# Patient Record
Sex: FEMALE | Race: WHITE | NOT HISPANIC OR LATINO | Employment: FULL TIME | URBAN - METROPOLITAN AREA
[De-identification: names, ages, dates, MRNs, and addresses within clinical notes are randomized per-mention and may not be internally consistent; named-entity substitution may affect disease eponyms.]

---

## 2023-04-10 DIAGNOSIS — G47.00 INSOMNIA, UNSPECIFIED: ICD-10-CM

## 2023-04-10 RX ORDER — TRAZODONE HYDROCHLORIDE 50 MG/1
TABLET ORAL
Qty: 90 TABLET | Refills: 1 | Status: SHIPPED | OUTPATIENT
Start: 2023-04-10 | End: 2023-07-07 | Stop reason: SDUPTHER

## 2023-07-07 ENCOUNTER — OFFICE VISIT (OUTPATIENT)
Dept: PRIMARY CARE | Facility: CLINIC | Age: 58
End: 2023-07-07
Payer: COMMERCIAL

## 2023-07-07 VITALS
HEIGHT: 62 IN | DIASTOLIC BLOOD PRESSURE: 80 MMHG | WEIGHT: 204 LBS | SYSTOLIC BLOOD PRESSURE: 138 MMHG | HEART RATE: 74 BPM | OXYGEN SATURATION: 97 % | BODY MASS INDEX: 37.54 KG/M2

## 2023-07-07 DIAGNOSIS — R73.03 PREDIABETES: ICD-10-CM

## 2023-07-07 DIAGNOSIS — Z13.29 THYROID DISORDER SCREENING: ICD-10-CM

## 2023-07-07 DIAGNOSIS — Z00.00 ANNUAL PHYSICAL EXAM: Primary | ICD-10-CM

## 2023-07-07 DIAGNOSIS — Z13.220 LIPID SCREENING: ICD-10-CM

## 2023-07-07 DIAGNOSIS — E66.9 OBESITY (BMI 35.0-39.9 WITHOUT COMORBIDITY): ICD-10-CM

## 2023-07-07 DIAGNOSIS — G47.00 INSOMNIA, UNSPECIFIED: ICD-10-CM

## 2023-07-07 DIAGNOSIS — F41.9 ANXIETY: ICD-10-CM

## 2023-07-07 PROBLEM — R63.5 WEIGHT GAIN: Status: ACTIVE | Noted: 2023-07-07

## 2023-07-07 PROBLEM — J30.2 SEASONAL ALLERGIC RHINITIS: Status: ACTIVE | Noted: 2023-07-07

## 2023-07-07 PROBLEM — M54.42 ACUTE BILATERAL LOW BACK PAIN WITH LEFT-SIDED SCIATICA: Status: RESOLVED | Noted: 2023-07-07 | Resolved: 2023-07-07

## 2023-07-07 PROBLEM — N95.1 VASOMOTOR SYMPTOMS DUE TO MENOPAUSE: Status: ACTIVE | Noted: 2023-07-07

## 2023-07-07 PROBLEM — M54.42 ACUTE BILATERAL LOW BACK PAIN WITH LEFT-SIDED SCIATICA: Status: ACTIVE | Noted: 2023-07-07

## 2023-07-07 PROBLEM — K58.9 IRRITABLE BOWEL SYNDROME: Status: ACTIVE | Noted: 2023-07-07

## 2023-07-07 PROBLEM — N39.3 FEMALE STRESS INCONTINENCE: Status: ACTIVE | Noted: 2023-07-07

## 2023-07-07 PROBLEM — E55.9 VITAMIN D DEFICIENCY: Status: ACTIVE | Noted: 2023-07-07

## 2023-07-07 PROBLEM — H90.3 ASYMMETRIC SNHL (SENSORINEURAL HEARING LOSS): Status: ACTIVE | Noted: 2023-07-07

## 2023-07-07 PROBLEM — F32.A DEPRESSION: Status: ACTIVE | Noted: 2023-07-07

## 2023-07-07 LAB
ESTIMATED AVERAGE GLUCOSE FOR HBA1C: 111 MG/DL
HEMOGLOBIN A1C/HEMOGLOBIN TOTAL IN BLOOD: 5.5 %
THYROTROPIN (MIU/L) IN SER/PLAS BY DETECTION LIMIT <= 0.05 MIU/L: 2.2 MIU/L (ref 0.44–3.98)

## 2023-07-07 PROCEDURE — 80053 COMPREHEN METABOLIC PANEL: CPT

## 2023-07-07 PROCEDURE — 84443 ASSAY THYROID STIM HORMONE: CPT

## 2023-07-07 PROCEDURE — 1036F TOBACCO NON-USER: CPT | Performed by: STUDENT IN AN ORGANIZED HEALTH CARE EDUCATION/TRAINING PROGRAM

## 2023-07-07 PROCEDURE — 85027 COMPLETE CBC AUTOMATED: CPT

## 2023-07-07 PROCEDURE — 83036 HEMOGLOBIN GLYCOSYLATED A1C: CPT

## 2023-07-07 PROCEDURE — 99396 PREV VISIT EST AGE 40-64: CPT | Performed by: STUDENT IN AN ORGANIZED HEALTH CARE EDUCATION/TRAINING PROGRAM

## 2023-07-07 PROCEDURE — 80061 LIPID PANEL: CPT

## 2023-07-07 RX ORDER — TRAZODONE HYDROCHLORIDE 50 MG/1
50 TABLET ORAL NIGHTLY PRN
Qty: 90 TABLET | Refills: 3 | Status: SHIPPED | OUTPATIENT
Start: 2023-07-07

## 2023-07-07 RX ORDER — SERTRALINE HYDROCHLORIDE 100 MG/1
100 TABLET, FILM COATED ORAL DAILY
Qty: 90 TABLET | Refills: 3 | Status: SHIPPED | OUTPATIENT
Start: 2023-07-07

## 2023-07-07 RX ORDER — ACETAMINOPHEN 500 MG
TABLET ORAL
COMMUNITY

## 2023-07-07 RX ORDER — SERTRALINE HYDROCHLORIDE 100 MG/1
100 TABLET, FILM COATED ORAL DAILY
COMMUNITY
End: 2023-07-07 | Stop reason: SDUPTHER

## 2023-07-07 ASSESSMENT — ENCOUNTER SYMPTOMS
RESPIRATORY NEGATIVE: 1
CARDIOVASCULAR NEGATIVE: 1
GASTROINTESTINAL NEGATIVE: 1
LIGHT-HEADEDNESS: 0
UNEXPECTED WEIGHT CHANGE: 1
HEADACHES: 0
SLEEP DISTURBANCE: 1
BACK PAIN: 1
FATIGUE: 0

## 2023-07-07 ASSESSMENT — PATIENT HEALTH QUESTIONNAIRE - PHQ9
2. FEELING DOWN, DEPRESSED OR HOPELESS: NOT AT ALL
SUM OF ALL RESPONSES TO PHQ9 QUESTIONS 1 AND 2: 0
1. LITTLE INTEREST OR PLEASURE IN DOING THINGS: NOT AT ALL

## 2023-07-07 NOTE — PROGRESS NOTES
Subjective   Patient ID: Christina Burger is a 58 y.o. female who presents for Follow-up (Follow up visit).  Back pain, left side. Takes ibupforen and does stretching. Always there low level.     Taking trazodone 1-2x per week. Gets slightly groggy.     Started omeprazole for GERD, started about 4 weeks ago. Has helped.     Taking loratadine for her allergies. Has helped.     Right ear hearing loss about 1 year ago after several trips. Saw ENT.     Concerned about her weight. Interested in possible bariatric surgery. Thinks she does some emotional eating. Has gained and lost weight over the years. Has not previously tried medications. Open to trying something.     UTD on mammogram and colonoscopy.             Review of Systems   Constitutional:  Positive for unexpected weight change. Negative for fatigue.   HENT:  Positive for congestion.    Eyes:  Negative for visual disturbance.   Respiratory: Negative.     Cardiovascular: Negative.    Gastrointestinal: Negative.    Genitourinary:  Positive for urgency.   Musculoskeletal:  Positive for back pain.   Skin: Negative.    Neurological:  Negative for light-headedness and headaches.   Psychiatric/Behavioral:  Positive for sleep disturbance.    All other systems reviewed and are negative.      Objective   Physical Exam  Vitals reviewed.   Constitutional:       General: She is not in acute distress.     Appearance: Normal appearance. She is not ill-appearing.   HENT:      Head: Normocephalic.      Right Ear: Tympanic membrane, ear canal and external ear normal. There is no impacted cerumen.      Left Ear: Tympanic membrane, ear canal and external ear normal. There is no impacted cerumen.      Mouth/Throat:      Mouth: Mucous membranes are moist.   Eyes:      Pupils: Pupils are equal, round, and reactive to light.   Cardiovascular:      Rate and Rhythm: Normal rate and regular rhythm.   Pulmonary:      Effort: Pulmonary effort is normal. No respiratory distress.       Breath sounds: Normal breath sounds. No wheezing or rhonchi.   Musculoskeletal:         General: Normal range of motion.      Cervical back: Normal range of motion.   Skin:     General: Skin is warm and dry.   Neurological:      General: No focal deficit present.      Mental Status: She is alert. Mental status is at baseline.   Psychiatric:         Mood and Affect: Mood normal.         Behavior: Behavior normal.           Current Outpatient Medications:     cholecalciferol (Vitamin D-3) 50 mcg (2,000 unit) capsule, Take by mouth., Disp: , Rfl:     sertraline (Zoloft) 100 mg tablet, Take 1 tablet (100 mg) by mouth once daily., Disp: 90 tablet, Rfl: 3    traZODone (Desyrel) 50 mg tablet, Take 1 tablet (50 mg) by mouth as needed at bedtime for sleep., Disp: 90 tablet, Rfl: 3      Assessment/Plan   Diagnoses and all orders for this visit:  Annual physical exam  Comments:  mammogram due 1/24  colonoscopy due 2024  Orders:  -     Comprehensive Metabolic Panel  -     CBC  Insomnia, unspecified  -     traZODone (Desyrel) 50 mg tablet; Take 1 tablet (50 mg) by mouth as needed at bedtime for sleep.  Anxiety  -     sertraline (Zoloft) 100 mg tablet; Take 1 tablet (100 mg) by mouth once daily.  Obesity (BMI 35.0-39.9 without comorbidity)  Comments:  she is considering gastric bypass  struggled with weight for years  discussed med options pending labs  Orders:  -     Referral to Nutrition Services; Future  Prediabetes  -     Hemoglobin A1c  Lipid screening  -     Lipid Panel  Thyroid disorder screening  -     TSH with reflex to Free T4 if abnormal    The patient received  nutrition referral  because they have an above normal BMI.      Follow up in 6 months    Dlay Rice DO

## 2023-07-08 LAB
ALANINE AMINOTRANSFERASE (SGPT) (U/L) IN SER/PLAS: 22 U/L (ref 7–45)
ALBUMIN (G/DL) IN SER/PLAS: 4.4 G/DL (ref 3.4–5)
ALKALINE PHOSPHATASE (U/L) IN SER/PLAS: 125 U/L (ref 33–110)
ANION GAP IN SER/PLAS: 16 MMOL/L (ref 10–20)
ASPARTATE AMINOTRANSFERASE (SGOT) (U/L) IN SER/PLAS: 21 U/L (ref 9–39)
BILIRUBIN TOTAL (MG/DL) IN SER/PLAS: 0.4 MG/DL (ref 0–1.2)
CALCIUM (MG/DL) IN SER/PLAS: 9.9 MG/DL (ref 8.6–10.6)
CARBON DIOXIDE, TOTAL (MMOL/L) IN SER/PLAS: 28 MMOL/L (ref 21–32)
CHLORIDE (MMOL/L) IN SER/PLAS: 101 MMOL/L (ref 98–107)
CHOLESTEROL (MG/DL) IN SER/PLAS: 265 MG/DL (ref 0–199)
CHOLESTEROL IN HDL (MG/DL) IN SER/PLAS: 74.9 MG/DL
CHOLESTEROL/HDL RATIO: 3.5
CREATININE (MG/DL) IN SER/PLAS: 0.7 MG/DL (ref 0.5–1.05)
ERYTHROCYTE DISTRIBUTION WIDTH (RATIO) BY AUTOMATED COUNT: 12.9 % (ref 11.5–14.5)
ERYTHROCYTE MEAN CORPUSCULAR HEMOGLOBIN CONCENTRATION (G/DL) BY AUTOMATED: 32 G/DL (ref 32–36)
ERYTHROCYTE MEAN CORPUSCULAR VOLUME (FL) BY AUTOMATED COUNT: 96 FL (ref 80–100)
ERYTHROCYTES (10*6/UL) IN BLOOD BY AUTOMATED COUNT: 4.18 X10E12/L (ref 4–5.2)
GFR FEMALE: >90 ML/MIN/1.73M2
GLUCOSE (MG/DL) IN SER/PLAS: 86 MG/DL (ref 74–99)
HEMATOCRIT (%) IN BLOOD BY AUTOMATED COUNT: 40 % (ref 36–46)
HEMOGLOBIN (G/DL) IN BLOOD: 12.8 G/DL (ref 12–16)
LDL: 158 MG/DL (ref 0–99)
LEUKOCYTES (10*3/UL) IN BLOOD BY AUTOMATED COUNT: 9.4 X10E9/L (ref 4.4–11.3)
NRBC (PER 100 WBCS) BY AUTOMATED COUNT: 0 /100 WBC (ref 0–0)
PLATELETS (10*3/UL) IN BLOOD AUTOMATED COUNT: 339 X10E9/L (ref 150–450)
POTASSIUM (MMOL/L) IN SER/PLAS: 4.6 MMOL/L (ref 3.5–5.3)
PROTEIN TOTAL: 7.7 G/DL (ref 6.4–8.2)
SODIUM (MMOL/L) IN SER/PLAS: 140 MMOL/L (ref 136–145)
TRIGLYCERIDE (MG/DL) IN SER/PLAS: 159 MG/DL (ref 0–149)
UREA NITROGEN (MG/DL) IN SER/PLAS: 21 MG/DL (ref 6–23)
VLDL: 32 MG/DL (ref 0–40)

## 2023-07-10 ENCOUNTER — PATIENT MESSAGE (OUTPATIENT)
Dept: PRIMARY CARE | Facility: CLINIC | Age: 58
End: 2023-07-10
Payer: COMMERCIAL

## 2023-07-10 DIAGNOSIS — E66.9 OBESITY (BMI 35.0-39.9 WITHOUT COMORBIDITY): Primary | ICD-10-CM

## 2023-08-04 DIAGNOSIS — E66.9 OBESITY (BMI 35.0-39.9 WITHOUT COMORBIDITY): Primary | ICD-10-CM

## 2023-08-04 RX ORDER — PHENTERMINE AND TOPIRAMATE 7.5; 46 MG/1; MG/1
1 CAPSULE, EXTENDED RELEASE ORAL DAILY
Qty: 28 CAPSULE | Refills: 2 | Status: SHIPPED | OUTPATIENT
Start: 2023-08-04 | End: 2023-09-20 | Stop reason: SINTOL

## 2023-09-06 ENCOUNTER — APPOINTMENT (OUTPATIENT)
Dept: PRIMARY CARE | Facility: CLINIC | Age: 58
End: 2023-09-06
Payer: COMMERCIAL

## 2023-09-20 ENCOUNTER — OFFICE VISIT (OUTPATIENT)
Dept: PRIMARY CARE | Facility: CLINIC | Age: 58
End: 2023-09-20
Payer: COMMERCIAL

## 2023-09-20 VITALS
DIASTOLIC BLOOD PRESSURE: 82 MMHG | SYSTOLIC BLOOD PRESSURE: 100 MMHG | BODY MASS INDEX: 36.76 KG/M2 | HEART RATE: 80 BPM | OXYGEN SATURATION: 96 % | WEIGHT: 201 LBS

## 2023-09-20 DIAGNOSIS — G44.51 HEMICRANIA CONTINUA: Primary | ICD-10-CM

## 2023-09-20 DIAGNOSIS — S03.40XS SPRAIN OF TEMPOROMANDIBULAR JOINT, SEQUELA: ICD-10-CM

## 2023-09-20 PROCEDURE — 1036F TOBACCO NON-USER: CPT | Performed by: STUDENT IN AN ORGANIZED HEALTH CARE EDUCATION/TRAINING PROGRAM

## 2023-09-20 PROCEDURE — 99213 OFFICE O/P EST LOW 20 MIN: CPT | Performed by: STUDENT IN AN ORGANIZED HEALTH CARE EDUCATION/TRAINING PROGRAM

## 2023-09-20 PROCEDURE — 3008F BODY MASS INDEX DOCD: CPT | Performed by: STUDENT IN AN ORGANIZED HEALTH CARE EDUCATION/TRAINING PROGRAM

## 2023-09-20 RX ORDER — INDOMETHACIN 50 MG/1
50 CAPSULE ORAL
Qty: 60 CAPSULE | Refills: 0 | Status: SHIPPED | OUTPATIENT
Start: 2023-09-20 | End: 2023-10-18

## 2023-09-20 ASSESSMENT — PATIENT HEALTH QUESTIONNAIRE - PHQ9
1. LITTLE INTEREST OR PLEASURE IN DOING THINGS: NOT AT ALL
2. FEELING DOWN, DEPRESSED OR HOPELESS: NOT AT ALL
SUM OF ALL RESPONSES TO PHQ9 QUESTIONS 1 AND 2: 0

## 2023-09-20 NOTE — PROGRESS NOTES
Subjective   Patient ID: Christina Burger is a 58 y.o. female who presents for Headache (Headaches that have been very often almost daily, takes ibuprofen).  Left jaw and ear hurting. Came on suddenly. Felt a clunk. Has never had this before. Clenches her teeth. Is in process of getting mouthguard, was referred to specialist.     Posterior lateral left headache. Describes as nagging. Ibuprofen improves it. Present everyday. Started 2-3 months ago. Not getting worse but not resolving.      Sleep has been good. Stress levels stable.     No vision changes.     Did also have headaches with phentermine, these were different type and resolved with stopping the medication.         Review of Systems   Eyes:  Negative for visual disturbance.   Respiratory: Negative.     Cardiovascular: Negative.    Genitourinary: Negative.    Neurological:  Positive for headaches. Negative for dizziness and light-headedness.   Psychiatric/Behavioral:  Negative for sleep disturbance. The patient is not nervous/anxious.    All other systems reviewed and are negative.      Objective   Physical Exam  Vitals reviewed.   Constitutional:       Appearance: Normal appearance.   HENT:      Head: Normocephalic.      Mouth/Throat:      Mouth: Mucous membranes are moist.   Eyes:      Extraocular Movements: Extraocular movements intact.      Conjunctiva/sclera: Conjunctivae normal.      Pupils: Pupils are equal, round, and reactive to light.   Pulmonary:      Effort: No respiratory distress.   Musculoskeletal:         General: Normal range of motion.   Skin:     General: Skin is warm and dry.   Neurological:      General: No focal deficit present.      Mental Status: She is alert and oriented to person, place, and time. Mental status is at baseline.      Cranial Nerves: No cranial nerve deficit.      Motor: No weakness.   Psychiatric:         Mood and Affect: Mood normal.         Behavior: Behavior normal.           Current Outpatient Medications:      cholecalciferol (Vitamin D-3) 50 mcg (2,000 unit) capsule, Take by mouth., Disp: , Rfl:     sertraline (Zoloft) 100 mg tablet, Take 1 tablet (100 mg) by mouth once daily., Disp: 90 tablet, Rfl: 3    traZODone (Desyrel) 50 mg tablet, Take 1 tablet (50 mg) by mouth as needed at bedtime for sleep., Disp: 90 tablet, Rfl: 3    indomethacin (Indocin) 50 mg capsule, Take 1 capsule (50 mg) by mouth 2 times a day with meals., Disp: 60 capsule, Rfl: 0      Assessment/Plan   Diagnoses and all orders for this visit:  Hemicrania continua  Comments:  no migrainous features  improves with NSAIDs  indomethacin trial and will wean down  if no improvement will get head CT  Orders:  -     indomethacin (Indocin) 50 mg capsule; Take 1 capsule (50 mg) by mouth 2 times a day with meals.  BMI 36.0-36.9,adult  Comments:  side effects with phentermine  can try contrave in future  Sprain of temporomandibular joint, sequela  Comments:  in process of getting mouthguard with specialist    The patient received  medication options  because they have an above normal BMI.      Follow up in 2 months    Daly Rice, DO

## 2023-09-21 ASSESSMENT — ENCOUNTER SYMPTOMS
NERVOUS/ANXIOUS: 0
RESPIRATORY NEGATIVE: 1
HEADACHES: 1
LIGHT-HEADEDNESS: 0
CARDIOVASCULAR NEGATIVE: 1
SLEEP DISTURBANCE: 0
DIZZINESS: 0

## 2023-10-18 DIAGNOSIS — G44.51 HEMICRANIA CONTINUA: ICD-10-CM

## 2023-10-18 RX ORDER — INDOMETHACIN 50 MG/1
50 CAPSULE ORAL
Qty: 60 CAPSULE | Refills: 0 | Status: SHIPPED | OUTPATIENT
Start: 2023-10-18 | End: 2023-11-17

## 2023-11-17 DIAGNOSIS — G44.51 HEMICRANIA CONTINUA: ICD-10-CM

## 2023-11-17 RX ORDER — INDOMETHACIN 50 MG/1
50 CAPSULE ORAL
Qty: 60 CAPSULE | Refills: 0 | Status: SHIPPED | OUTPATIENT
Start: 2023-11-17 | End: 2024-02-15

## 2024-03-05 ENCOUNTER — OFFICE VISIT (OUTPATIENT)
Dept: PRIMARY CARE | Facility: CLINIC | Age: 59
End: 2024-03-05
Payer: COMMERCIAL

## 2024-03-05 VITALS
SYSTOLIC BLOOD PRESSURE: 140 MMHG | WEIGHT: 202 LBS | OXYGEN SATURATION: 98 % | HEART RATE: 75 BPM | DIASTOLIC BLOOD PRESSURE: 90 MMHG | BODY MASS INDEX: 37.17 KG/M2 | HEIGHT: 62 IN

## 2024-03-05 DIAGNOSIS — Z12.11 ENCOUNTER FOR SCREENING FOR MALIGNANT NEOPLASM OF COLON: ICD-10-CM

## 2024-03-05 DIAGNOSIS — S03.40XS SPRAIN OF TEMPOROMANDIBULAR JOINT, SEQUELA: ICD-10-CM

## 2024-03-05 DIAGNOSIS — E66.9 OBESITY (BMI 35.0-39.9 WITHOUT COMORBIDITY): ICD-10-CM

## 2024-03-05 DIAGNOSIS — Z12.31 BREAST CANCER SCREENING BY MAMMOGRAM: Primary | ICD-10-CM

## 2024-03-05 DIAGNOSIS — R73.03 PREDIABETES: ICD-10-CM

## 2024-03-05 PROCEDURE — 1036F TOBACCO NON-USER: CPT | Performed by: STUDENT IN AN ORGANIZED HEALTH CARE EDUCATION/TRAINING PROGRAM

## 2024-03-05 PROCEDURE — 99213 OFFICE O/P EST LOW 20 MIN: CPT | Performed by: STUDENT IN AN ORGANIZED HEALTH CARE EDUCATION/TRAINING PROGRAM

## 2024-03-05 PROCEDURE — 3008F BODY MASS INDEX DOCD: CPT | Performed by: STUDENT IN AN ORGANIZED HEALTH CARE EDUCATION/TRAINING PROGRAM

## 2024-03-05 RX ORDER — OMEPRAZOLE 20 MG/1
20 CAPSULE, DELAYED RELEASE ORAL
COMMUNITY

## 2024-03-05 RX ORDER — LORATADINE 10 MG/1
10 TABLET ORAL DAILY
COMMUNITY

## 2024-03-05 ASSESSMENT — ENCOUNTER SYMPTOMS
NERVOUS/ANXIOUS: 0
ABDOMINAL PAIN: 0
PALPITATIONS: 0
SHORTNESS OF BREATH: 0
UNEXPECTED WEIGHT CHANGE: 0
DIARRHEA: 0
DIFFICULTY URINATING: 0
CHEST TIGHTNESS: 0
CONSTIPATION: 0
SINUS PRESSURE: 0
HEADACHES: 0
DIZZINESS: 0
WHEEZING: 0
FATIGUE: 0
DYSPHORIC MOOD: 0
LIGHT-HEADEDNESS: 0
SLEEP DISTURBANCE: 0

## 2024-03-05 ASSESSMENT — PATIENT HEALTH QUESTIONNAIRE - PHQ9
2. FEELING DOWN, DEPRESSED OR HOPELESS: NOT AT ALL
1. LITTLE INTEREST OR PLEASURE IN DOING THINGS: NOT AT ALL
SUM OF ALL RESPONSES TO PHQ9 QUESTIONS 1 AND 2: 0

## 2024-03-05 NOTE — PROGRESS NOTES
Subjective   Patient ID: Christina Burger is a 58 y.o. female who presents for Follow-up (Follow up visit /).  Headaches got a lot better after about a month on indomethacin.     Has not been able To find a dietitian.  Interested in some help with this.  Did not tolerate phentermine.    TMJ has been bad.  Has tried mouth brace, not currently wearing.  Was referred to ENT, has not followed up yet.  Not sure what they would be able to offer her.    Due for her mammogram and colonoscopy.    Reports otherwise doing well, no concerns.        Review of Systems   Constitutional:  Negative for fatigue and unexpected weight change.   HENT:  Negative for congestion, ear pain and sinus pressure.    Eyes:  Negative for visual disturbance.   Respiratory:  Negative for chest tightness, shortness of breath and wheezing.    Cardiovascular:  Negative for chest pain, palpitations and leg swelling.   Gastrointestinal:  Negative for abdominal pain, constipation and diarrhea.   Genitourinary:  Negative for difficulty urinating.   Skin:  Negative for rash.   Neurological:  Negative for dizziness, light-headedness and headaches.   Psychiatric/Behavioral:  Negative for dysphoric mood and sleep disturbance. The patient is not nervous/anxious.    All other systems reviewed and are negative.      Objective   Physical Exam  Vitals reviewed.   Constitutional:       General: She is not in acute distress.  HENT:      Head: Normocephalic.      Right Ear: External ear normal.      Left Ear: External ear normal.      Nose: Nose normal.   Eyes:      Extraocular Movements: Extraocular movements intact.      Pupils: Pupils are equal, round, and reactive to light.   Cardiovascular:      Rate and Rhythm: Normal rate and regular rhythm.      Heart sounds: Normal heart sounds.   Pulmonary:      Effort: Pulmonary effort is normal.      Breath sounds: Normal breath sounds.   Abdominal:      Palpations: Abdomen is soft.      Tenderness: There is no  abdominal tenderness. There is no guarding.   Musculoskeletal:      Cervical back: Normal range of motion and neck supple.   Skin:     General: Skin is warm and dry.   Neurological:      Mental Status: She is alert. Mental status is at baseline.   Psychiatric:         Mood and Affect: Mood normal.         Behavior: Behavior normal.         Body mass index is 36.95 kg/m².      Current Outpatient Medications:     cholecalciferol (Vitamin D-3) 50 mcg (2,000 unit) capsule, Take by mouth., Disp: , Rfl:     loratadine (Claritin) 10 mg tablet, Take 1 tablet (10 mg) by mouth once daily., Disp: , Rfl:     omeprazole (PriLOSEC) 20 mg DR capsule, Take 1 capsule (20 mg) by mouth once daily in the morning. Take before meals. Do not crush or chew., Disp: , Rfl:     sertraline (Zoloft) 100 mg tablet, Take 1 tablet (100 mg) by mouth once daily., Disp: 90 tablet, Rfl: 3    traZODone (Desyrel) 50 mg tablet, Take 1 tablet (50 mg) by mouth as needed at bedtime for sleep., Disp: 90 tablet, Rfl: 3      Assessment/Plan   Diagnoses and all orders for this visit:  Breast cancer screening by mammogram  -     BI mammo bilateral screening tomosynthesis; Future  Encounter for screening for malignant neoplasm of colon  -     Colonoscopy Screening; Average Risk Patient; Future  Prediabetes  Obesity (BMI 35.0-39.9 without comorbidity)  Comments:  contact info given for RD  Sprain of temporomandibular joint, sequela  Comments:  recommend ENT follow up, would consider possible botox       Follow up in 4 months for INÉS Rice DO 03/05/24 4:26 PM

## 2024-03-16 ENCOUNTER — ANESTHESIA EVENT (OUTPATIENT)
Dept: GASTROENTEROLOGY | Facility: EXTERNAL LOCATION | Age: 59
End: 2024-03-16

## 2024-04-01 ENCOUNTER — APPOINTMENT (OUTPATIENT)
Dept: GASTROENTEROLOGY | Facility: EXTERNAL LOCATION | Age: 59
End: 2024-04-01
Payer: COMMERCIAL

## 2024-04-01 ENCOUNTER — ANESTHESIA (OUTPATIENT)
Dept: GASTROENTEROLOGY | Facility: EXTERNAL LOCATION | Age: 59
End: 2024-04-01

## 2024-04-24 ENCOUNTER — APPOINTMENT (OUTPATIENT)
Dept: RADIOLOGY | Facility: CLINIC | Age: 59
End: 2024-04-24
Payer: COMMERCIAL

## 2024-05-10 ENCOUNTER — HOSPITAL ENCOUNTER (OUTPATIENT)
Dept: RADIOLOGY | Facility: HOSPITAL | Age: 59
Discharge: HOME | End: 2024-05-10
Payer: COMMERCIAL

## 2024-05-10 VITALS — WEIGHT: 200 LBS | HEIGHT: 63 IN | BODY MASS INDEX: 35.44 KG/M2

## 2024-05-10 DIAGNOSIS — Z12.31 BREAST CANCER SCREENING BY MAMMOGRAM: ICD-10-CM

## 2024-05-10 PROCEDURE — 77067 SCR MAMMO BI INCL CAD: CPT | Performed by: STUDENT IN AN ORGANIZED HEALTH CARE EDUCATION/TRAINING PROGRAM

## 2024-05-10 PROCEDURE — 77067 SCR MAMMO BI INCL CAD: CPT

## 2024-05-10 PROCEDURE — 77063 BREAST TOMOSYNTHESIS BI: CPT | Performed by: STUDENT IN AN ORGANIZED HEALTH CARE EDUCATION/TRAINING PROGRAM

## 2024-06-17 ENCOUNTER — HOSPITAL ENCOUNTER (OUTPATIENT)
Dept: RADIOLOGY | Facility: HOSPITAL | Age: 59
Discharge: HOME | End: 2024-06-17
Payer: COMMERCIAL

## 2024-06-17 ENCOUNTER — OFFICE VISIT (OUTPATIENT)
Dept: ORTHOPEDIC SURGERY | Facility: CLINIC | Age: 59
End: 2024-06-17
Payer: COMMERCIAL

## 2024-06-17 DIAGNOSIS — M25.511 ACUTE PAIN OF RIGHT SHOULDER: ICD-10-CM

## 2024-06-17 DIAGNOSIS — M75.21 BICEPS TENDINITIS ON RIGHT: Primary | ICD-10-CM

## 2024-06-17 DIAGNOSIS — M75.81 TENDINITIS OF RIGHT ROTATOR CUFF: ICD-10-CM

## 2024-06-17 PROCEDURE — 73030 X-RAY EXAM OF SHOULDER: CPT | Mod: RIGHT SIDE | Performed by: RADIOLOGY

## 2024-06-17 PROCEDURE — 99204 OFFICE O/P NEW MOD 45 MIN: CPT | Performed by: ORTHOPAEDIC SURGERY

## 2024-06-17 PROCEDURE — 3008F BODY MASS INDEX DOCD: CPT | Performed by: ORTHOPAEDIC SURGERY

## 2024-06-17 PROCEDURE — 73030 X-RAY EXAM OF SHOULDER: CPT | Mod: RT

## 2024-06-17 PROCEDURE — 20610 DRAIN/INJ JOINT/BURSA W/O US: CPT | Performed by: ORTHOPAEDIC SURGERY

## 2024-06-17 RX ORDER — BETAMETHASONE SODIUM PHOSPHATE AND BETAMETHASONE ACETATE 3; 3 MG/ML; MG/ML
2 INJECTION, SUSPENSION INTRA-ARTICULAR; INTRALESIONAL; INTRAMUSCULAR; SOFT TISSUE
Status: COMPLETED | OUTPATIENT
Start: 2024-06-17 | End: 2024-06-17

## 2024-06-17 RX ORDER — LIDOCAINE HYDROCHLORIDE 10 MG/ML
5 INJECTION INFILTRATION; PERINEURAL
Status: COMPLETED | OUTPATIENT
Start: 2024-06-17 | End: 2024-06-17

## 2024-06-17 NOTE — PROGRESS NOTES
History of present: 4 to 5-month history of right shoulder pain    The patient does not have any recollection of a traumatic event    Pain is now become increasingly located to the right shoulder bicipital groove night pain lifting pain she occasionally gets some numbness into her hand but otherwise motor intact and things move well just increasing pain and discomfort right shoulder        Past medical history:    The patient's past medical history, family history, social history and review of systems were documented on the patient's medical intake form.  The medical intake form was reviewed and scanned into the electronic medical record for future use.  History is otherwise negative except as stated in the history of present illness.    Physical exam:    General: Alert and oriented to person place and time.  No acute distress and breathing comfortably, pleasant and cooperative with examination.    Head and neck exam: Head is normocephalic atraumatic.    Neck: Supple no visible swelling or deformity.    Cardiovascular: Good perfusion to affected extremities without signs or symptoms of chest pain.    Lungs no audible wheezing or labored breathing on examination.    Abdominal exam: Nondistended nontender    Extremities: The right shoulder was inspected and was found to have no obvious deformity.  There was tenderness to touch over the lateral edges of the shoulder over the rotator cuff insertion.  Active forward flexion 150 degrees, external rotation to 60 degrees, abduction to 50 degrees, and internal rotation to the level of L2.    At this time the shoulder is neurovascular tact and neurosensory intact.  Motor intact C5-T1.  There was no obvious neck pain or radiculopathy noted.  There was no gross instability or adhesive capsulitis symptoms.  There was no evidence of apprehension or apprehension suppression.    Strength was tested in 4 planes with weakness in the supraspinatus strength testing and external  rotation position.  There was no strength deficit in internal rotation.  Impingement signs were positive both supine and standing for impingement test type I and II.      There was considerable pain over the bicipital groove with a positive speeds sign    Before aspiration injection the benefits of a cortisone injection including infection, local skin irritation, skin atrophy, calcification, continued pain and discomfort, elevated blood sugar, burning, failure to relieve pain, possible late infection were discussed with the patient.    Postprocedure discomfort can be alleviated with additional medications, ice, elevation, rest over the first 24 hours as recommended.    Patient verbalized understanding and wanted to proceed with the planned procedure.    After informed consent was provided and allergies verified, the patient was positioned appropriately on thel bed.  The right shoulder to be aspirated and injected was prepped and draped in a sterile fashion.  The skin was anesthetized with ethyl chloride spray.  A joint aspiration was to be performed an 18-gauge needle was used otherwise a 22-gauge needle was used to inject the appropriate joint.    Joint injection was performed with a mixture of 5 cc 1% lidocaine plain and 2 cc Celestone Soluspan 6 mg per mL.  The needle was removed and the puncture site closed and sealed with a Band-Aid.  The patient tolerated the procedure well.        Diagnostic studies: X-rays 2 views right shoulder essentially normal      Impression: Right shoulder cuff and bicep tendinitis      Plan: Cortisone shot anterior bicipital groove    PT therapy rehab program for cuff and bicep tenosynovitis    Will see her back in the office repeat eval and assess 3 to 4 weeks if not significantly improved she will call for MRI imaging study        L Inj/Asp: R subacromial bursa on 6/17/2024 1:19 PM  Indications: pain  Details: 22 G needle, medial approach  Medications: 2 mL betamethasone acet,sod  phos 6 mg/mL; 5 mL lidocaine 10 mg/mL (1 %)  Outcome: tolerated well, no immediate complications  Procedure, treatment alternatives, risks and benefits explained, specific risks discussed. Consent was given by the patient. Immediately prior to procedure a time out was called to verify the correct patient, procedure, equipment, support staff and site/side marked as required.

## 2024-07-11 DIAGNOSIS — F41.9 ANXIETY: ICD-10-CM

## 2024-07-11 RX ORDER — SERTRALINE HYDROCHLORIDE 100 MG/1
100 TABLET, FILM COATED ORAL DAILY
Qty: 90 TABLET | Refills: 3 | Status: SHIPPED | OUTPATIENT
Start: 2024-07-11

## 2024-11-12 DIAGNOSIS — Z12.11 COLON CANCER SCREENING: ICD-10-CM

## 2024-11-12 RX ORDER — SODIUM, POTASSIUM,MAG SULFATES 17.5-3.13G
1 SOLUTION, RECONSTITUTED, ORAL ORAL 2 TIMES DAILY
Qty: 1 EACH | Refills: 0 | Status: SHIPPED | OUTPATIENT
Start: 2024-12-06 | End: 2024-12-07

## 2024-11-19 PROBLEM — N13.5 URETEROPELVIC JUNCTION (UPJ) OBSTRUCTION: Status: ACTIVE | Noted: 2024-11-19

## 2024-11-19 PROBLEM — H92.01 RIGHT EAR PAIN: Status: ACTIVE | Noted: 2022-03-28

## 2024-11-19 PROBLEM — M79.673 PAIN OF FOOT: Status: ACTIVE | Noted: 2024-11-19

## 2024-11-19 PROBLEM — M99.09 SEGMENTAL AND SOMATIC DYSFUNCTION: Status: ACTIVE | Noted: 2024-11-19

## 2024-11-19 PROBLEM — B48.8 OPPORTUNISTIC MYCOSIS (MULTI): Status: ACTIVE | Noted: 2024-11-19

## 2024-11-19 PROBLEM — N39.0 URINARY TRACT INFECTION: Status: ACTIVE | Noted: 2024-11-19

## 2024-11-19 PROBLEM — M79.9 DISORDER OF SOFT TISSUE: Status: ACTIVE | Noted: 2024-11-19

## 2024-11-19 PROBLEM — H60.90 OTITIS EXTERNA: Status: ACTIVE | Noted: 2024-11-19

## 2024-11-19 PROBLEM — M79.10 MUSCLE PAIN: Status: ACTIVE | Noted: 2024-11-19

## 2024-12-09 ENCOUNTER — ANESTHESIA EVENT (OUTPATIENT)
Dept: GASTROENTEROLOGY | Facility: EXTERNAL LOCATION | Age: 59
End: 2024-12-09

## 2024-12-14 ENCOUNTER — ANESTHESIA (OUTPATIENT)
Dept: GASTROENTEROLOGY | Facility: EXTERNAL LOCATION | Age: 59
End: 2024-12-14

## 2024-12-14 ENCOUNTER — APPOINTMENT (OUTPATIENT)
Dept: GASTROENTEROLOGY | Facility: EXTERNAL LOCATION | Age: 59
End: 2024-12-14
Payer: COMMERCIAL

## 2024-12-14 VITALS
RESPIRATION RATE: 19 BRPM | BODY MASS INDEX: 36.32 KG/M2 | WEIGHT: 205 LBS | HEART RATE: 72 BPM | TEMPERATURE: 98.2 F | DIASTOLIC BLOOD PRESSURE: 79 MMHG | SYSTOLIC BLOOD PRESSURE: 141 MMHG | OXYGEN SATURATION: 100 % | HEIGHT: 63 IN

## 2024-12-14 DIAGNOSIS — Z12.11 ENCOUNTER FOR SCREENING FOR MALIGNANT NEOPLASM OF COLON: ICD-10-CM

## 2024-12-14 PROCEDURE — 45380 COLONOSCOPY AND BIOPSY: CPT | Performed by: INTERNAL MEDICINE

## 2024-12-14 PROCEDURE — 45385 COLONOSCOPY W/LESION REMOVAL: CPT | Performed by: INTERNAL MEDICINE

## 2024-12-14 RX ORDER — SODIUM CHLORIDE 9 MG/ML
INJECTION, SOLUTION INTRAVENOUS CONTINUOUS PRN
Status: DISCONTINUED | OUTPATIENT
Start: 2024-12-14 | End: 2024-12-14

## 2024-12-14 RX ORDER — PROPOFOL 10 MG/ML
INJECTION, EMULSION INTRAVENOUS AS NEEDED
Status: DISCONTINUED | OUTPATIENT
Start: 2024-12-14 | End: 2024-12-14

## 2024-12-14 RX ORDER — LIDOCAINE HYDROCHLORIDE 20 MG/ML
INJECTION, SOLUTION INFILTRATION; PERINEURAL AS NEEDED
Status: DISCONTINUED | OUTPATIENT
Start: 2024-12-14 | End: 2024-12-14

## 2024-12-14 RX ORDER — ONDANSETRON HYDROCHLORIDE 2 MG/ML
4 INJECTION, SOLUTION INTRAVENOUS ONCE AS NEEDED
Status: DISCONTINUED | OUTPATIENT
Start: 2024-12-14 | End: 2024-12-15 | Stop reason: HOSPADM

## 2024-12-14 SDOH — HEALTH STABILITY: MENTAL HEALTH: CURRENT SMOKER: 0

## 2024-12-14 ASSESSMENT — PAIN - FUNCTIONAL ASSESSMENT
PAIN_FUNCTIONAL_ASSESSMENT: 0-10

## 2024-12-14 ASSESSMENT — COLUMBIA-SUICIDE SEVERITY RATING SCALE - C-SSRS
6. HAVE YOU EVER DONE ANYTHING, STARTED TO DO ANYTHING, OR PREPARED TO DO ANYTHING TO END YOUR LIFE?: NO
1. IN THE PAST MONTH, HAVE YOU WISHED YOU WERE DEAD OR WISHED YOU COULD GO TO SLEEP AND NOT WAKE UP?: NO
2. HAVE YOU ACTUALLY HAD ANY THOUGHTS OF KILLING YOURSELF?: NO

## 2024-12-14 ASSESSMENT — PAIN DESCRIPTION - DESCRIPTORS: DESCRIPTORS: CRAMPING

## 2024-12-14 ASSESSMENT — PAIN SCALES - GENERAL
PAIN_LEVEL: 0
PAINLEVEL_OUTOF10: 2
PAINLEVEL_OUTOF10: 0 - NO PAIN

## 2024-12-14 NOTE — DISCHARGE INSTRUCTIONS
Patient Instructions Post Endoscopy Procedure      The anesthetics, sedatives or narcotics which were given to you today will be acting in your body for the next 24 hours, so you might feel a little sleepy or groggy.  This feeling should slowly wear off. Carefully read and follow the instructions.     You received sedation today:  - Do not drive or operate any machinery or power tools of any kind.   - No alcoholic beverages today, not even beer or wine.  - Do not make any important decisions or sign any legal documents.  - No over the counter medications that contain alcohol or that may cause drowsiness.    While it is common to experience mild to moderate abdominal distention, gas, or belching after your procedure, if any of these symptoms occur following discharge from the GI Lab or within one week of having your procedure, call the [facility] to be advised whether a visit to your nearest Urgent Care or Emergency Department is indicated.  Take this paper with you if you go.   - If you develop an allergic reaction to the medications that were given during your procedure such as difficulty breathing, rash, hives, severe nausea, vomiting or lightheadedness.  - If you experience chest pain, shortness of breath, severe abdominal pain, fevers and chills.  -If you develop signs and symptoms of bleeding such as blood in your spit, if your stools turn black, tarry, or bloody  - If you have not urinated within 8 hours following your procedure.  - If your IV site becomes painful, red, inflamed, or looks infected.    If you received a biopsy/polypectomy the following instructions apply below:  _x_ Do not use non-steroidal medications or anti-coagulants for one week following your procedure. (Examples of these types of medications are: Advil, Arthrotec, Aleve, Coumadin, Ecotrin, Heparin, Ibuprofen, Indocin, Motrin, Naprosyn, Nuprin, Plavix, Vioxx, and Voltarin, or their generic forms.  This list is not  all-inclusive.  Check with your physician or pharmacist before resuming medications.)   _x_ Eat a soft diet today.  Avoid foods that are poorly digested for the next 24 hours.  These foods would include: nuts, beans, lettuce, red meats, and fried foods. Start with liquids and advance your diet as tolerated, gradually work up to eating solids.   _x_ You can restart your ASA tomorrow  __ You can resume your anticoagulation therapy -     Your physician recommends the additional following instructions:    -You have a contact number available for emergencies. The signs and symptoms of potential delayed complications were discussed with you. You may return to normal activities tomorrow.  -Resume your previous diet or other if specified.  -Continue your present medications.   -We are waiting for your pathology results, if applicable. The results will be available in [facility]. I will send you a message with any recommendations.  -The findings and recommendations have been discussed with you and/or family.  -Please see Medication Reconciliation Form for new medication/medications prescribed.     If you experience any problems or have any questions following discharge from the GI Lab, please call: [phone] from 7 am- 4:30 pm.  In the event of an emergency please go to the closest Emergency Department or call Dr. Gant at [phone]

## 2024-12-14 NOTE — ANESTHESIA POSTPROCEDURE EVALUATION
Patient: Christina Burger    Procedure Summary       Date: 12/14/24 Room / Location: [facility]    Anesthesia Start: 1142 Anesthesia Stop:     Procedure: COLONOSCOPY Diagnosis: Encounter for screening for malignant neoplasm of colon    Scheduled Providers: Lalitha NAVARRO MD; MARCO Lyon Responsible Provider: MARCO Lyon    Anesthesia Type: MAC ASA Status: 2            Anesthesia Type: MAC    Vitals Value Taken Time   /74 12/14/24 1210   Temp 36.8 12/14/24 1210   Pulse 66 12/14/24 1210   Resp 17 12/14/24 1210   SpO2 98 12/14/24 1210       Anesthesia Post Evaluation    Patient location during evaluation: bedside  Patient participation: complete - patient participated  Level of consciousness: awake  Pain score: 0  Pain management: adequate  Airway patency: patent  Cardiovascular status: acceptable  Respiratory status: acceptable and room air  Hydration status: acceptable  Postoperative Nausea and Vomiting: none      No notable events documented.

## 2024-12-14 NOTE — ANESTHESIA PREPROCEDURE EVALUATION
Patient: Christina Burger    Procedure Information       Date/Time: 12/14/24 1150    Scheduled providers: Lalitha NAVARRO MD; REGIS Lyon-CRNA    Procedure: COLONOSCOPY    Location: [facility]            Relevant Problems   Neuro   (+) Anxiety   (+) Depression      GI   (+) Irritable bowel syndrome      /Renal   (+) Urinary tract infection      HEENT   (+) Asymmetric SNHL (sensorineural hearing loss)      ID   (+) Opportunistic mycosis (Multi)   (+) Urinary tract infection       Clinical information reviewed:   Tobacco  Allergies  Meds   Med Hx  Surg Hx   Fam Hx  Soc Hx        NPO Detail:  NPO/Void Status  Carbohydrate Drink Given Prior to Surgery? : Y  Date of Last Liquid: 12/14/24  Time of Last Liquid: 0800  Date of Last Solid: 12/12/24  Time of Last Solid: 1300  Last Intake Type: Clear fluids; GI prep  Time of Last Void: 1104         Physical Exam    Airway  Mallampati: III  TM distance: >3 FB  Neck ROM: full     Cardiovascular - normal exam     Dental - normal exam     Pulmonary - normal exam     Abdominal   (+) obese         Anesthesia Plan    History of general anesthesia?: yes  History of complications of general anesthesia?: no    ASA 2     MAC   (Preoxygenated 2L prior to procedure.  Patient positioned self to comfort prior to sedation administered; eyes closed; continuous monitoring.)  The patient is not a current smoker.    intravenous induction   Anesthetic plan and risks discussed with patient.    Plan discussed with CRNA.

## 2024-12-14 NOTE — H&P
Outpatient Hospital Procedure    Patient Profile-Procedures  Initial Info  Patient Demographics  Name Christina Burger  Date of Birth 1965  MRN [id number]  Address   [address] [place] [place] [id number] [place] [address]    Primary Phone Number [phone]  Secondary Phone Number    Daly Allred    Procedures   Colonoscopy      Indication:  Screening - avg risk    Primary contact name and number   Extended Emergency Contact Information  Primary Emergency Contact: JennyferDarell  Address: [address], [facility] [facility]  Home Phone: [phone]  Work Phone: [phone]  Mobile Phone: [phone]  Relation: Spouse    General Health  Weight   Vitals:    24 1102   Weight: 93 kg (205 lb)     BMI Body mass index is 36.31 kg/m².    Allergies  No Known Allergies    Past Medical History   Past Medical History:   Diagnosis Date    Acute bilateral low back pain with left-sided sciatica 2023    Anxiety     Depression     Follicular cyst of the skin and subcutaneous tissue, unspecified 06/10/2018    Scalp cyst    Other conditions influencing health status      3    Other specified disorders of eustachian tube, right ear 2022    Dysfunction of right eustachian tube    Personal history of other diseases of the digestive system 2018    History of biliary colic    Seasonal allergies        Provider assessment  Diagnosis  Medication Reviewed - yes  Prior to Admission medications    Medication Sig Start Date End Date Taking? Authorizing Provider   cholecalciferol (Vitamin D-3) 50 mcg (2,000 unit) capsule Take by mouth.   Yes Historical Provider, MD   loratadine (Claritin) 10 mg tablet Take 1 tablet (10 mg) by mouth once daily.   Yes Historical Provider, MD   omeprazole (PriLOSEC) 20 mg DR capsule Take 1 capsule (20 mg) by mouth once daily in the morning. Take before meals. Do not crush or chew.   Yes Historical  Sae, MD   sertraline (Zoloft) 100 mg tablet TAKE 1 TABLET BY MOUTH EVERY DAY 7/11/24  Yes Daly Rice DO   traZODone (Desyrel) 50 mg tablet Take 1 tablet (50 mg) by mouth as needed at bedtime for sleep. 7/7/23  Yes Daly Rice DO   sodium,potassium,mag sulfates (Suprep Bowel Prep Kit) 17.5-3.13-1.6 gram recon soln solution Take 1 bottle by mouth 2 times a day for 1 day. Follow instructions given Do not fill before December 6, 2024. 12/6/24 12/14/24 Yes Lalitha NAVARRO MD       This is my H&P    Physical Exam  Physical Exam  Constitutional:       Comments: Awake   HENT:      Head: Normocephalic.   Cardiovascular:      Rate and Rhythm: Normal rate and regular rhythm.   Pulmonary:      Effort: Pulmonary effort is normal.      Breath sounds: Normal breath sounds.   Abdominal:      General: Bowel sounds are normal.      Palpations: Abdomen is soft.   Neurological:      Mental Status: She is alert.   Psychiatric:         Mood and Affect: Mood normal.           Oropharyngeal Classification II (hard and soft palate, upper portion of tonsils and uvula visible)  ASA PS Classification 3  Sedation Plan Deep  Procedure Plan - pre-procedural (re)assesment completed by physician:  discharge/transfer patient when discharge criteria met    Lalitha Gant MD  12/14/2024 11:39 AM

## 2024-12-24 LAB
LABORATORY COMMENT REPORT: NORMAL
PATH REPORT.FINAL DX SPEC: NORMAL
PATH REPORT.GROSS SPEC: NORMAL
PATH REPORT.TOTAL CANCER: NORMAL

## 2025-01-20 ENCOUNTER — APPOINTMENT (OUTPATIENT)
Dept: PRIMARY CARE | Facility: CLINIC | Age: 60
End: 2025-01-20
Payer: COMMERCIAL

## 2025-01-20 VITALS
BODY MASS INDEX: 37.21 KG/M2 | DIASTOLIC BLOOD PRESSURE: 74 MMHG | OXYGEN SATURATION: 98 % | SYSTOLIC BLOOD PRESSURE: 140 MMHG | HEART RATE: 67 BPM | WEIGHT: 210 LBS | HEIGHT: 63 IN

## 2025-01-20 DIAGNOSIS — F41.9 ANXIETY: Chronic | ICD-10-CM

## 2025-01-20 DIAGNOSIS — Z13.220 LIPID SCREENING: ICD-10-CM

## 2025-01-20 DIAGNOSIS — Z12.31 BREAST CANCER SCREENING BY MAMMOGRAM: ICD-10-CM

## 2025-01-20 DIAGNOSIS — Z00.00 ANNUAL PHYSICAL EXAM: Primary | ICD-10-CM

## 2025-01-20 DIAGNOSIS — Z13.29 THYROID DISORDER SCREENING: ICD-10-CM

## 2025-01-20 DIAGNOSIS — J45.21 MILD INTERMITTENT REACTIVE AIRWAY DISEASE WITH WHEEZING WITH ACUTE EXACERBATION (HHS-HCC): ICD-10-CM

## 2025-01-20 DIAGNOSIS — R73.03 PREDIABETES: ICD-10-CM

## 2025-01-20 LAB
ALBUMIN SERPL BCP-MCNC: 4.4 G/DL (ref 3.4–5)
ALP SERPL-CCNC: 129 U/L (ref 33–136)
ALT SERPL W P-5'-P-CCNC: 18 U/L (ref 7–45)
ANION GAP SERPL CALC-SCNC: 18 MMOL/L (ref 10–20)
AST SERPL W P-5'-P-CCNC: 18 U/L (ref 9–39)
BILIRUB SERPL-MCNC: 0.4 MG/DL (ref 0–1.2)
BUN SERPL-MCNC: 19 MG/DL (ref 6–23)
CALCIUM SERPL-MCNC: 9.4 MG/DL (ref 8.6–10.6)
CHLORIDE SERPL-SCNC: 100 MMOL/L (ref 98–107)
CHOLEST SERPL-MCNC: 258 MG/DL (ref 0–199)
CHOLESTEROL/HDL RATIO: 4
CO2 SERPL-SCNC: 26 MMOL/L (ref 21–32)
CREAT SERPL-MCNC: 0.6 MG/DL (ref 0.5–1.05)
EGFRCR SERPLBLD CKD-EPI 2021: >90 ML/MIN/1.73M*2
ERYTHROCYTE [DISTWIDTH] IN BLOOD BY AUTOMATED COUNT: 12.8 % (ref 11.5–14.5)
EST. AVERAGE GLUCOSE BLD GHB EST-MCNC: 111 MG/DL
GLUCOSE SERPL-MCNC: 80 MG/DL (ref 74–99)
HBA1C MFR BLD: 5.5 %
HCT VFR BLD AUTO: 39.4 % (ref 36–46)
HDLC SERPL-MCNC: 64.7 MG/DL
HGB BLD-MCNC: 12.5 G/DL (ref 12–16)
LDLC SERPL CALC-MCNC: 158 MG/DL
MCH RBC QN AUTO: 29.1 PG (ref 26–34)
MCHC RBC AUTO-ENTMCNC: 31.7 G/DL (ref 32–36)
MCV RBC AUTO: 92 FL (ref 80–100)
NON HDL CHOLESTEROL: 193 MG/DL (ref 0–149)
NRBC BLD-RTO: 0 /100 WBCS (ref 0–0)
PLATELET # BLD AUTO: 336 X10*3/UL (ref 150–450)
POTASSIUM SERPL-SCNC: 4.6 MMOL/L (ref 3.5–5.3)
PROT SERPL-MCNC: 7.7 G/DL (ref 6.4–8.2)
RBC # BLD AUTO: 4.29 X10*6/UL (ref 4–5.2)
SODIUM SERPL-SCNC: 139 MMOL/L (ref 136–145)
TRIGL SERPL-MCNC: 178 MG/DL (ref 0–149)
TSH SERPL-ACNC: 1.79 MIU/L (ref 0.44–3.98)
VLDL: 36 MG/DL (ref 0–40)
WBC # BLD AUTO: 8.9 X10*3/UL (ref 4.4–11.3)

## 2025-01-20 PROCEDURE — 80053 COMPREHEN METABOLIC PANEL: CPT

## 2025-01-20 PROCEDURE — 3008F BODY MASS INDEX DOCD: CPT | Performed by: STUDENT IN AN ORGANIZED HEALTH CARE EDUCATION/TRAINING PROGRAM

## 2025-01-20 PROCEDURE — 99396 PREV VISIT EST AGE 40-64: CPT | Performed by: STUDENT IN AN ORGANIZED HEALTH CARE EDUCATION/TRAINING PROGRAM

## 2025-01-20 PROCEDURE — 85027 COMPLETE CBC AUTOMATED: CPT

## 2025-01-20 PROCEDURE — 80061 LIPID PANEL: CPT

## 2025-01-20 PROCEDURE — 1036F TOBACCO NON-USER: CPT | Performed by: STUDENT IN AN ORGANIZED HEALTH CARE EDUCATION/TRAINING PROGRAM

## 2025-01-20 PROCEDURE — 84443 ASSAY THYROID STIM HORMONE: CPT

## 2025-01-20 PROCEDURE — 83036 HEMOGLOBIN GLYCOSYLATED A1C: CPT

## 2025-01-20 RX ORDER — ALBUTEROL SULFATE 90 UG/1
2 INHALANT RESPIRATORY (INHALATION) EVERY 4 HOURS PRN
Qty: 8.5 G | Refills: 0 | Status: SHIPPED | OUTPATIENT
Start: 2025-01-20 | End: 2026-01-20

## 2025-01-20 RX ORDER — BISMUTH SUBSALICYLATE 262 MG
1 TABLET,CHEWABLE ORAL DAILY
COMMUNITY

## 2025-01-20 RX ORDER — SERTRALINE HYDROCHLORIDE 100 MG/1
100 TABLET, FILM COATED ORAL DAILY
Qty: 90 TABLET | Refills: 3 | Status: SHIPPED | OUTPATIENT
Start: 2025-01-20

## 2025-01-20 ASSESSMENT — ENCOUNTER SYMPTOMS
ARTHRALGIAS: 1
CONSTIPATION: 0
SLEEP DISTURBANCE: 0
DIARRHEA: 0
ABDOMINAL PAIN: 0
DIFFICULTY URINATING: 0
SHORTNESS OF BREATH: 0
FATIGUE: 0
NERVOUS/ANXIOUS: 0
HEADACHES: 0
DYSPHORIC MOOD: 0
WHEEZING: 1
CHEST TIGHTNESS: 0
PALPITATIONS: 0
UNEXPECTED WEIGHT CHANGE: 0
LIGHT-HEADEDNESS: 0
SINUS PRESSURE: 0
DIZZINESS: 0

## 2025-01-20 ASSESSMENT — PATIENT HEALTH QUESTIONNAIRE - PHQ9
1. LITTLE INTEREST OR PLEASURE IN DOING THINGS: NOT AT ALL
10. IF YOU CHECKED OFF ANY PROBLEMS, HOW DIFFICULT HAVE THESE PROBLEMS MADE IT FOR YOU TO DO YOUR WORK, TAKE CARE OF THINGS AT HOME, OR GET ALONG WITH OTHER PEOPLE: SOMEWHAT DIFFICULT
SUM OF ALL RESPONSES TO PHQ9 QUESTIONS 1 AND 2: 1
2. FEELING DOWN, DEPRESSED OR HOPELESS: SEVERAL DAYS

## 2025-01-20 NOTE — PROGRESS NOTES
Subjective   Patient ID: Christina Burger is a 59 y.o. female who presents for Annual Exam (Physical /).  Occasional aches and pains. Some right shoulder pain. Had an injection in June. States helped some. Thinks pain is due to how she sleeps at night. Has tried to work on this.    Had colonoscopy last month. Recommended 3 year follow up.     Fasting for labs.     Doing well on her zoloft dose. Controlling her symptoms well.     Has not had any severe headaches lately.     Noticing some wheezing with physical activity. Denies this happening at other time. No prior asthma testing or hx. Has never used inhaler before.     Last pap over 5 years ago.     Wants to lose weight. Is active but states her diet could use some work.     No other concerns today.         Review of Systems   Constitutional:  Negative for fatigue and unexpected weight change.   HENT:  Negative for congestion, ear pain and sinus pressure.    Eyes:  Negative for visual disturbance.   Respiratory:  Positive for wheezing. Negative for chest tightness and shortness of breath.    Cardiovascular:  Negative for chest pain, palpitations and leg swelling.   Gastrointestinal:  Negative for abdominal pain, constipation and diarrhea.   Genitourinary:  Negative for difficulty urinating.   Musculoskeletal:  Positive for arthralgias.   Skin:  Negative for rash.   Neurological:  Negative for dizziness, light-headedness and headaches.   Psychiatric/Behavioral:  Negative for dysphoric mood and sleep disturbance. The patient is not nervous/anxious.    All other systems reviewed and are negative.      Objective   Physical Exam  Vitals reviewed.   Constitutional:       General: She is not in acute distress.  HENT:      Head: Normocephalic.      Right Ear: External ear normal.      Left Ear: External ear normal.      Nose: Nose normal.   Eyes:      Extraocular Movements: Extraocular movements intact.      Pupils: Pupils are equal, round, and reactive to light.    Cardiovascular:      Rate and Rhythm: Normal rate and regular rhythm.      Heart sounds: Normal heart sounds.   Pulmonary:      Effort: Pulmonary effort is normal.      Breath sounds: Wheezing (faint wheezes lower lobes) present.   Abdominal:      Palpations: Abdomen is soft.      Tenderness: There is no abdominal tenderness. There is no guarding.   Musculoskeletal:         General: Normal range of motion.      Cervical back: Normal range of motion and neck supple.   Skin:     General: Skin is warm and dry.   Neurological:      Mental Status: She is alert. Mental status is at baseline.   Psychiatric:         Mood and Affect: Mood normal.         Behavior: Behavior normal.         Body mass index is 37.2 kg/m².      Current Outpatient Medications:     cholecalciferol (Vitamin D-3) 50 mcg (2,000 unit) capsule, Take by mouth., Disp: , Rfl:     loratadine (Claritin) 10 mg tablet, Take 1 tablet (10 mg) by mouth once daily., Disp: , Rfl:     multivitamin tablet, Take 1 tablet by mouth once daily., Disp: , Rfl:     omeprazole (PriLOSEC) 20 mg DR capsule, Take 1 capsule (20 mg) by mouth once daily in the morning. Take before meals. Do not crush or chew., Disp: , Rfl:     traZODone (Desyrel) 50 mg tablet, Take 1 tablet (50 mg) by mouth as needed at bedtime for sleep., Disp: 90 tablet, Rfl: 3    albuterol (ProAir HFA) 90 mcg/actuation inhaler, Inhale 2 puffs every 4 hours if needed for wheezing or shortness of breath., Disp: 8.5 g, Rfl: 0    sertraline (Zoloft) 100 mg tablet, Take 1 tablet (100 mg) by mouth once daily., Disp: 90 tablet, Rfl: 3    Assessment/Plan   Diagnoses and all orders for this visit:  Annual physical exam  Comments:  mammogram due May  recommend following up with gyn for pap  UTD on colonoscopy  Orders:  -     Comprehensive Metabolic Panel  -     CBC  Anxiety  Comments:  doing well on her sertraline  no changes  Orders:  -     sertraline (Zoloft) 100 mg tablet; Take 1 tablet (100 mg) by mouth once  daily.  Prediabetes  -     Hemoglobin A1c  Breast cancer screening by mammogram  -     BI mammo bilateral screening tomosynthesis; Future  Lipid screening  -     Lipid Panel  Thyroid disorder screening  -     TSH with reflex to Free T4 if abnormal  Mild intermittent reactive airway disease with wheezing with acute exacerbation ([facility]-H[facility])  Comments:  trial albuterol  reassess if not improving  consider CT and PFTs  Orders:  -     albuterol (ProAir HFA) 90 mcg/actuation inhaler; Inhale 2 puffs every 4 hours if needed for wheezing or shortness of breath.    Follow up annually or sooner as needed       Daly Rice DO 01/20/25 11:19 AM

## 2025-01-22 DIAGNOSIS — E78.2 MIXED HYPERLIPIDEMIA: Primary | ICD-10-CM

## 2025-01-22 RX ORDER — ROSUVASTATIN CALCIUM 5 MG/1
5 TABLET, COATED ORAL DAILY
Qty: 100 TABLET | Refills: 3 | Status: SHIPPED | OUTPATIENT
Start: 2025-01-22 | End: 2026-02-26

## 2025-01-22 NOTE — PROGRESS NOTES
Subjective   Patient ID: Christina Burger is a 59 y.o. female who presents for No chief complaint on file..  HPI    Review of Systems    Objective   Physical Exam    Assessment/Plan            Daly Rice DO 01/22/25 12:48 PM

## 2025-02-04 ENCOUNTER — ANESTHESIA EVENT (OUTPATIENT)
Dept: OPERATING ROOM | Facility: HOSPITAL | Age: 60
End: 2025-02-04
Payer: COMMERCIAL

## 2025-02-04 ENCOUNTER — ANESTHESIA (OUTPATIENT)
Dept: OPERATING ROOM | Facility: HOSPITAL | Age: 60
End: 2025-02-04
Payer: COMMERCIAL

## 2025-02-04 ENCOUNTER — APPOINTMENT (OUTPATIENT)
Dept: RADIOLOGY | Facility: HOSPITAL | Age: 60
End: 2025-02-04
Payer: COMMERCIAL

## 2025-02-04 ENCOUNTER — HOSPITAL ENCOUNTER (OUTPATIENT)
Facility: HOSPITAL | Age: 60
Setting detail: OBSERVATION
Discharge: HOME | End: 2025-02-05
Attending: STUDENT IN AN ORGANIZED HEALTH CARE EDUCATION/TRAINING PROGRAM | Admitting: STUDENT IN AN ORGANIZED HEALTH CARE EDUCATION/TRAINING PROGRAM
Payer: COMMERCIAL

## 2025-02-04 DIAGNOSIS — K35.80 ACUTE APPENDICITIS, UNSPECIFIED ACUTE APPENDICITIS TYPE: Primary | ICD-10-CM

## 2025-02-04 LAB
ALBUMIN SERPL BCP-MCNC: 4.6 G/DL (ref 3.4–5)
ALP SERPL-CCNC: 116 U/L (ref 33–136)
ALT SERPL W P-5'-P-CCNC: 15 U/L (ref 7–45)
ANION GAP SERPL CALC-SCNC: 13 MMOL/L (ref 10–20)
APPEARANCE UR: CLEAR
AST SERPL W P-5'-P-CCNC: 14 U/L (ref 9–39)
BACTERIA #/AREA URNS AUTO: ABNORMAL /HPF
BASOPHILS # BLD AUTO: 0.1 X10*3/UL (ref 0–0.1)
BASOPHILS NFR BLD AUTO: 0.6 %
BILIRUB SERPL-MCNC: 0.5 MG/DL (ref 0–1.2)
BILIRUB UR STRIP.AUTO-MCNC: NEGATIVE MG/DL
BUN SERPL-MCNC: 16 MG/DL (ref 6–23)
CALCIUM SERPL-MCNC: 9.4 MG/DL (ref 8.6–10.3)
CHLORIDE SERPL-SCNC: 102 MMOL/L (ref 98–107)
CO2 SERPL-SCNC: 28 MMOL/L (ref 21–32)
COLOR UR: ABNORMAL
CREAT SERPL-MCNC: 0.64 MG/DL (ref 0.5–1.05)
EGFRCR SERPLBLD CKD-EPI 2021: >90 ML/MIN/1.73M*2
EOSINOPHIL # BLD AUTO: 0.21 X10*3/UL (ref 0–0.7)
EOSINOPHIL NFR BLD AUTO: 1.3 %
ERYTHROCYTE [DISTWIDTH] IN BLOOD BY AUTOMATED COUNT: 12.4 % (ref 11.5–14.5)
GLUCOSE SERPL-MCNC: 106 MG/DL (ref 74–99)
GLUCOSE UR STRIP.AUTO-MCNC: NORMAL MG/DL
HCT VFR BLD AUTO: 37.9 % (ref 36–46)
HGB BLD-MCNC: 12.5 G/DL (ref 12–16)
HOLD SPECIMEN: NORMAL
IMM GRANULOCYTES # BLD AUTO: 0.06 X10*3/UL (ref 0–0.7)
IMM GRANULOCYTES NFR BLD AUTO: 0.4 % (ref 0–0.9)
KETONES UR STRIP.AUTO-MCNC: NEGATIVE MG/DL
LEUKOCYTE ESTERASE UR QL STRIP.AUTO: ABNORMAL
LIPASE SERPL-CCNC: 9 U/L (ref 9–82)
LYMPHOCYTES # BLD AUTO: 2.62 X10*3/UL (ref 1.2–4.8)
LYMPHOCYTES NFR BLD AUTO: 16.5 %
MCH RBC QN AUTO: 29.7 PG (ref 26–34)
MCHC RBC AUTO-ENTMCNC: 33 G/DL (ref 32–36)
MCV RBC AUTO: 90 FL (ref 80–100)
MONOCYTES # BLD AUTO: 0.95 X10*3/UL (ref 0.1–1)
MONOCYTES NFR BLD AUTO: 6 %
MUCOUS THREADS #/AREA URNS AUTO: ABNORMAL /LPF
NEUTROPHILS # BLD AUTO: 11.92 X10*3/UL (ref 1.2–7.7)
NEUTROPHILS NFR BLD AUTO: 75.2 %
NITRITE UR QL STRIP.AUTO: NEGATIVE
NRBC BLD-RTO: 0 /100 WBCS (ref 0–0)
PH UR STRIP.AUTO: 5.5 [PH]
PLATELET # BLD AUTO: 308 X10*3/UL (ref 150–450)
POTASSIUM SERPL-SCNC: 4 MMOL/L (ref 3.5–5.3)
PROT SERPL-MCNC: 8 G/DL (ref 6.4–8.2)
PROT UR STRIP.AUTO-MCNC: NEGATIVE MG/DL
RBC # BLD AUTO: 4.21 X10*6/UL (ref 4–5.2)
RBC # UR STRIP.AUTO: NEGATIVE MG/DL
RBC #/AREA URNS AUTO: ABNORMAL /HPF
SODIUM SERPL-SCNC: 139 MMOL/L (ref 136–145)
SP GR UR STRIP.AUTO: 1.01
UROBILINOGEN UR STRIP.AUTO-MCNC: NORMAL MG/DL
WBC # BLD AUTO: 15.9 X10*3/UL (ref 4.4–11.3)
WBC #/AREA URNS AUTO: ABNORMAL /HPF

## 2025-02-04 PROCEDURE — 2500000004 HC RX 250 GENERAL PHARMACY W/ HCPCS (ALT 636 FOR OP/ED): Performed by: NURSE PRACTITIONER

## 2025-02-04 PROCEDURE — A44970 PR LAP,APPENDECTOMY: Performed by: ANESTHESIOLOGIST ASSISTANT

## 2025-02-04 PROCEDURE — 74177 CT ABD & PELVIS W/CONTRAST: CPT

## 2025-02-04 PROCEDURE — 2500000002 HC RX 250 W HCPCS SELF ADMINISTERED DRUGS (ALT 637 FOR MEDICARE OP, ALT 636 FOR OP/ED): Performed by: STUDENT IN AN ORGANIZED HEALTH CARE EDUCATION/TRAINING PROGRAM

## 2025-02-04 PROCEDURE — 2500000004 HC RX 250 GENERAL PHARMACY W/ HCPCS (ALT 636 FOR OP/ED): Performed by: STUDENT IN AN ORGANIZED HEALTH CARE EDUCATION/TRAINING PROGRAM

## 2025-02-04 PROCEDURE — 2500000004 HC RX 250 GENERAL PHARMACY W/ HCPCS (ALT 636 FOR OP/ED): Performed by: ANESTHESIOLOGIST ASSISTANT

## 2025-02-04 PROCEDURE — 87086 URINE CULTURE/COLONY COUNT: CPT | Mod: STJLAB | Performed by: STUDENT IN AN ORGANIZED HEALTH CARE EDUCATION/TRAINING PROGRAM

## 2025-02-04 PROCEDURE — 99222 1ST HOSP IP/OBS MODERATE 55: CPT | Performed by: STUDENT IN AN ORGANIZED HEALTH CARE EDUCATION/TRAINING PROGRAM

## 2025-02-04 PROCEDURE — 2720000007 HC OR 272 NO HCPCS: Performed by: STUDENT IN AN ORGANIZED HEALTH CARE EDUCATION/TRAINING PROGRAM

## 2025-02-04 PROCEDURE — 2500000005 HC RX 250 GENERAL PHARMACY W/O HCPCS: Performed by: STUDENT IN AN ORGANIZED HEALTH CARE EDUCATION/TRAINING PROGRAM

## 2025-02-04 PROCEDURE — 2500000005 HC RX 250 GENERAL PHARMACY W/O HCPCS: Performed by: ANESTHESIOLOGIST ASSISTANT

## 2025-02-04 PROCEDURE — 80053 COMPREHEN METABOLIC PANEL: CPT | Performed by: STUDENT IN AN ORGANIZED HEALTH CARE EDUCATION/TRAINING PROGRAM

## 2025-02-04 PROCEDURE — G0378 HOSPITAL OBSERVATION PER HR: HCPCS

## 2025-02-04 PROCEDURE — A44970 PR LAP,APPENDECTOMY: Performed by: STUDENT IN AN ORGANIZED HEALTH CARE EDUCATION/TRAINING PROGRAM

## 2025-02-04 PROCEDURE — 99285 EMERGENCY DEPT VISIT HI MDM: CPT | Mod: 25 | Performed by: STUDENT IN AN ORGANIZED HEALTH CARE EDUCATION/TRAINING PROGRAM

## 2025-02-04 PROCEDURE — 7100000001 HC RECOVERY ROOM TIME - INITIAL BASE CHARGE: Performed by: STUDENT IN AN ORGANIZED HEALTH CARE EDUCATION/TRAINING PROGRAM

## 2025-02-04 PROCEDURE — 2500000001 HC RX 250 WO HCPCS SELF ADMINISTERED DRUGS (ALT 637 FOR MEDICARE OP): Performed by: NURSE PRACTITIONER

## 2025-02-04 PROCEDURE — 96376 TX/PRO/DX INJ SAME DRUG ADON: CPT | Mod: 59

## 2025-02-04 PROCEDURE — 81001 URINALYSIS AUTO W/SCOPE: CPT | Performed by: STUDENT IN AN ORGANIZED HEALTH CARE EDUCATION/TRAINING PROGRAM

## 2025-02-04 PROCEDURE — 3700000002 HC GENERAL ANESTHESIA TIME - EACH INCREMENTAL 1 MINUTE: Performed by: STUDENT IN AN ORGANIZED HEALTH CARE EDUCATION/TRAINING PROGRAM

## 2025-02-04 PROCEDURE — 83690 ASSAY OF LIPASE: CPT

## 2025-02-04 PROCEDURE — 3700000001 HC GENERAL ANESTHESIA TIME - INITIAL BASE CHARGE: Performed by: STUDENT IN AN ORGANIZED HEALTH CARE EDUCATION/TRAINING PROGRAM

## 2025-02-04 PROCEDURE — 74177 CT ABD & PELVIS W/CONTRAST: CPT | Performed by: RADIOLOGY

## 2025-02-04 PROCEDURE — 96365 THER/PROPH/DIAG IV INF INIT: CPT | Mod: 59

## 2025-02-04 PROCEDURE — 3600000004 HC OR TIME - INITIAL BASE CHARGE - PROCEDURE LEVEL FOUR: Performed by: STUDENT IN AN ORGANIZED HEALTH CARE EDUCATION/TRAINING PROGRAM

## 2025-02-04 PROCEDURE — 2500000001 HC RX 250 WO HCPCS SELF ADMINISTERED DRUGS (ALT 637 FOR MEDICARE OP): Performed by: STUDENT IN AN ORGANIZED HEALTH CARE EDUCATION/TRAINING PROGRAM

## 2025-02-04 PROCEDURE — 3600000009 HC OR TIME - EACH INCREMENTAL 1 MINUTE - PROCEDURE LEVEL FOUR: Performed by: STUDENT IN AN ORGANIZED HEALTH CARE EDUCATION/TRAINING PROGRAM

## 2025-02-04 PROCEDURE — 85025 COMPLETE CBC W/AUTO DIFF WBC: CPT | Performed by: STUDENT IN AN ORGANIZED HEALTH CARE EDUCATION/TRAINING PROGRAM

## 2025-02-04 PROCEDURE — 7100000002 HC RECOVERY ROOM TIME - EACH INCREMENTAL 1 MINUTE: Performed by: STUDENT IN AN ORGANIZED HEALTH CARE EDUCATION/TRAINING PROGRAM

## 2025-02-04 PROCEDURE — 2550000001 HC RX 255 CONTRASTS: Performed by: STUDENT IN AN ORGANIZED HEALTH CARE EDUCATION/TRAINING PROGRAM

## 2025-02-04 PROCEDURE — 36415 COLL VENOUS BLD VENIPUNCTURE: CPT | Performed by: STUDENT IN AN ORGANIZED HEALTH CARE EDUCATION/TRAINING PROGRAM

## 2025-02-04 PROCEDURE — 96375 TX/PRO/DX INJ NEW DRUG ADDON: CPT | Mod: 59

## 2025-02-04 PROCEDURE — 44970 LAPAROSCOPY APPENDECTOMY: CPT | Performed by: STUDENT IN AN ORGANIZED HEALTH CARE EDUCATION/TRAINING PROGRAM

## 2025-02-04 PROCEDURE — 2500000004 HC RX 250 GENERAL PHARMACY W/ HCPCS (ALT 636 FOR OP/ED)

## 2025-02-04 PROCEDURE — 88304 TISSUE EXAM BY PATHOLOGIST: CPT | Mod: TC,STJLAB | Performed by: STUDENT IN AN ORGANIZED HEALTH CARE EDUCATION/TRAINING PROGRAM

## 2025-02-04 RX ORDER — OXYCODONE HYDROCHLORIDE 5 MG/1
5 TABLET ORAL EVERY 6 HOURS PRN
Status: DISCONTINUED | OUTPATIENT
Start: 2025-02-04 | End: 2025-02-05 | Stop reason: HOSPADM

## 2025-02-04 RX ORDER — MAGNESIUM SULFATE HEPTAHYDRATE 500 MG/ML
INJECTION, SOLUTION INTRAMUSCULAR; INTRAVENOUS AS NEEDED
Status: DISCONTINUED | OUTPATIENT
Start: 2025-02-04 | End: 2025-02-04

## 2025-02-04 RX ORDER — LABETALOL HYDROCHLORIDE 5 MG/ML
5 INJECTION, SOLUTION INTRAVENOUS ONCE AS NEEDED
Status: DISCONTINUED | OUTPATIENT
Start: 2025-02-04 | End: 2025-02-04 | Stop reason: HOSPADM

## 2025-02-04 RX ORDER — FAMOTIDINE 10 MG/ML
INJECTION INTRAVENOUS AS NEEDED
Status: DISCONTINUED | OUTPATIENT
Start: 2025-02-04 | End: 2025-02-04

## 2025-02-04 RX ORDER — ROSUVASTATIN CALCIUM 5 MG/1
5 TABLET, COATED ORAL NIGHTLY
Status: DISCONTINUED | OUTPATIENT
Start: 2025-02-04 | End: 2025-02-05 | Stop reason: HOSPADM

## 2025-02-04 RX ORDER — ALBUTEROL SULFATE 90 UG/1
2 INHALANT RESPIRATORY (INHALATION) EVERY 4 HOURS PRN
Status: DISCONTINUED | OUTPATIENT
Start: 2025-02-04 | End: 2025-02-04

## 2025-02-04 RX ORDER — ALBUTEROL SULFATE 0.83 MG/ML
2.5 SOLUTION RESPIRATORY (INHALATION) EVERY 6 HOURS PRN
Status: DISCONTINUED | OUTPATIENT
Start: 2025-02-04 | End: 2025-02-05 | Stop reason: HOSPADM

## 2025-02-04 RX ORDER — DIPHENHYDRAMINE HYDROCHLORIDE 50 MG/ML
INJECTION INTRAMUSCULAR; INTRAVENOUS AS NEEDED
Status: DISCONTINUED | OUTPATIENT
Start: 2025-02-04 | End: 2025-02-04

## 2025-02-04 RX ORDER — SODIUM CHLORIDE, SODIUM LACTATE, POTASSIUM CHLORIDE, CALCIUM CHLORIDE 600; 310; 30; 20 MG/100ML; MG/100ML; MG/100ML; MG/100ML
100 INJECTION, SOLUTION INTRAVENOUS CONTINUOUS
Status: DISCONTINUED | OUTPATIENT
Start: 2025-02-04 | End: 2025-02-04 | Stop reason: HOSPADM

## 2025-02-04 RX ORDER — MORPHINE SULFATE 4 MG/ML
4 INJECTION, SOLUTION INTRAMUSCULAR; INTRAVENOUS ONCE
Status: COMPLETED | OUTPATIENT
Start: 2025-02-04 | End: 2025-02-04

## 2025-02-04 RX ORDER — LIDOCAINE HYDROCHLORIDE 10 MG/ML
0.1 INJECTION, SOLUTION INFILTRATION; PERINEURAL ONCE
Status: CANCELLED | OUTPATIENT
Start: 2025-02-04 | End: 2025-02-04

## 2025-02-04 RX ORDER — KETOROLAC TROMETHAMINE 30 MG/ML
30 INJECTION, SOLUTION INTRAMUSCULAR; INTRAVENOUS EVERY 6 HOURS SCHEDULED
Status: DISCONTINUED | OUTPATIENT
Start: 2025-02-04 | End: 2025-02-05 | Stop reason: HOSPADM

## 2025-02-04 RX ORDER — MIDAZOLAM HYDROCHLORIDE 1 MG/ML
INJECTION, SOLUTION INTRAMUSCULAR; INTRAVENOUS AS NEEDED
Status: DISCONTINUED | OUTPATIENT
Start: 2025-02-04 | End: 2025-02-04

## 2025-02-04 RX ORDER — FENTANYL CITRATE 50 UG/ML
INJECTION, SOLUTION INTRAMUSCULAR; INTRAVENOUS AS NEEDED
Status: DISCONTINUED | OUTPATIENT
Start: 2025-02-04 | End: 2025-02-04

## 2025-02-04 RX ORDER — SODIUM CHLORIDE 0.9 G/100ML
INJECTION, SOLUTION IRRIGATION AS NEEDED
Status: DISCONTINUED | OUTPATIENT
Start: 2025-02-04 | End: 2025-02-04 | Stop reason: HOSPADM

## 2025-02-04 RX ORDER — ALBUTEROL SULFATE 0.83 MG/ML
2.5 SOLUTION RESPIRATORY (INHALATION) ONCE AS NEEDED
Status: DISCONTINUED | OUTPATIENT
Start: 2025-02-04 | End: 2025-02-04 | Stop reason: HOSPADM

## 2025-02-04 RX ORDER — ROCURONIUM BROMIDE 50 MG/5 ML
SYRINGE (ML) INTRAVENOUS AS NEEDED
Status: DISCONTINUED | OUTPATIENT
Start: 2025-02-04 | End: 2025-02-04

## 2025-02-04 RX ORDER — ENOXAPARIN SODIUM 100 MG/ML
40 INJECTION SUBCUTANEOUS EVERY 24 HOURS
Status: DISCONTINUED | OUTPATIENT
Start: 2025-02-04 | End: 2025-02-05 | Stop reason: HOSPADM

## 2025-02-04 RX ORDER — PROPOFOL 10 MG/ML
INJECTION, EMULSION INTRAVENOUS AS NEEDED
Status: DISCONTINUED | OUTPATIENT
Start: 2025-02-04 | End: 2025-02-04

## 2025-02-04 RX ORDER — FENTANYL CITRATE 50 UG/ML
25 INJECTION, SOLUTION INTRAMUSCULAR; INTRAVENOUS EVERY 5 MIN PRN
Status: DISCONTINUED | OUTPATIENT
Start: 2025-02-04 | End: 2025-02-04 | Stop reason: HOSPADM

## 2025-02-04 RX ORDER — LIDOCAINE HYDROCHLORIDE 20 MG/ML
INJECTION, SOLUTION EPIDURAL; INFILTRATION; INTRACAUDAL; PERINEURAL AS NEEDED
Status: DISCONTINUED | OUTPATIENT
Start: 2025-02-04 | End: 2025-02-04

## 2025-02-04 RX ORDER — KETOROLAC TROMETHAMINE 30 MG/ML
INJECTION, SOLUTION INTRAMUSCULAR; INTRAVENOUS AS NEEDED
Status: DISCONTINUED | OUTPATIENT
Start: 2025-02-04 | End: 2025-02-04

## 2025-02-04 RX ORDER — MEPERIDINE HYDROCHLORIDE 50 MG/ML
12.5 INJECTION INTRAMUSCULAR; INTRAVENOUS; SUBCUTANEOUS EVERY 10 MIN PRN
Status: DISCONTINUED | OUTPATIENT
Start: 2025-02-04 | End: 2025-02-04 | Stop reason: HOSPADM

## 2025-02-04 RX ORDER — NALOXONE HYDROCHLORIDE 0.4 MG/ML
0.2 INJECTION, SOLUTION INTRAMUSCULAR; INTRAVENOUS; SUBCUTANEOUS EVERY 5 MIN PRN
Status: DISCONTINUED | OUTPATIENT
Start: 2025-02-04 | End: 2025-02-05 | Stop reason: HOSPADM

## 2025-02-04 RX ORDER — OXYCODONE HYDROCHLORIDE 10 MG/1
10 TABLET ORAL EVERY 4 HOURS PRN
Status: DISCONTINUED | OUTPATIENT
Start: 2025-02-04 | End: 2025-02-05 | Stop reason: HOSPADM

## 2025-02-04 RX ORDER — MORPHINE SULFATE 2 MG/ML
2 INJECTION, SOLUTION INTRAMUSCULAR; INTRAVENOUS
Status: DISCONTINUED | OUTPATIENT
Start: 2025-02-04 | End: 2025-02-04

## 2025-02-04 RX ORDER — SODIUM CHLORIDE, SODIUM LACTATE, POTASSIUM CHLORIDE, CALCIUM CHLORIDE 600; 310; 30; 20 MG/100ML; MG/100ML; MG/100ML; MG/100ML
100 INJECTION, SOLUTION INTRAVENOUS CONTINUOUS
Status: DISCONTINUED | OUTPATIENT
Start: 2025-02-04 | End: 2025-02-04

## 2025-02-04 RX ORDER — ONDANSETRON HYDROCHLORIDE 2 MG/ML
4 INJECTION, SOLUTION INTRAVENOUS EVERY 8 HOURS PRN
Status: DISCONTINUED | OUTPATIENT
Start: 2025-02-04 | End: 2025-02-04

## 2025-02-04 RX ORDER — BUPIVACAINE HCL/EPINEPHRINE 0.5-1:200K
VIAL (ML) INJECTION AS NEEDED
Status: DISCONTINUED | OUTPATIENT
Start: 2025-02-04 | End: 2025-02-04 | Stop reason: HOSPADM

## 2025-02-04 RX ORDER — ONDANSETRON HYDROCHLORIDE 2 MG/ML
4 INJECTION, SOLUTION INTRAVENOUS EVERY 8 HOURS PRN
Status: DISCONTINUED | OUTPATIENT
Start: 2025-02-04 | End: 2025-02-05 | Stop reason: HOSPADM

## 2025-02-04 RX ORDER — OXYCODONE HYDROCHLORIDE 5 MG/1
5 TABLET ORAL EVERY 4 HOURS PRN
Status: DISCONTINUED | OUTPATIENT
Start: 2025-02-04 | End: 2025-02-04 | Stop reason: HOSPADM

## 2025-02-04 RX ORDER — SERTRALINE HYDROCHLORIDE 100 MG/1
100 TABLET, FILM COATED ORAL DAILY
Status: DISCONTINUED | OUTPATIENT
Start: 2025-02-04 | End: 2025-02-05 | Stop reason: HOSPADM

## 2025-02-04 RX ORDER — ONDANSETRON HYDROCHLORIDE 2 MG/ML
4 INJECTION, SOLUTION INTRAVENOUS ONCE AS NEEDED
Status: DISCONTINUED | OUTPATIENT
Start: 2025-02-04 | End: 2025-02-04 | Stop reason: HOSPADM

## 2025-02-04 RX ORDER — ONDANSETRON 4 MG/1
4 TABLET, ORALLY DISINTEGRATING ORAL EVERY 8 HOURS PRN
Status: DISCONTINUED | OUTPATIENT
Start: 2025-02-04 | End: 2025-02-05 | Stop reason: HOSPADM

## 2025-02-04 RX ORDER — ONDANSETRON HYDROCHLORIDE 2 MG/ML
INJECTION, SOLUTION INTRAVENOUS AS NEEDED
Status: DISCONTINUED | OUTPATIENT
Start: 2025-02-04 | End: 2025-02-04

## 2025-02-04 RX ORDER — MIDAZOLAM HYDROCHLORIDE 1 MG/ML
1 INJECTION, SOLUTION INTRAMUSCULAR; INTRAVENOUS ONCE AS NEEDED
Status: DISCONTINUED | OUTPATIENT
Start: 2025-02-04 | End: 2025-02-04 | Stop reason: HOSPADM

## 2025-02-04 RX ORDER — ACETAMINOPHEN 325 MG/1
650 TABLET ORAL EVERY 6 HOURS
Status: DISCONTINUED | OUTPATIENT
Start: 2025-02-04 | End: 2025-02-05 | Stop reason: HOSPADM

## 2025-02-04 RX ADMIN — FAMOTIDINE 20 MG: 10 INJECTION, SOLUTION INTRAVENOUS at 16:27

## 2025-02-04 RX ADMIN — DIPHENHYDRAMINE HYDROCHLORIDE 25 MG: 50 INJECTION, SOLUTION INTRAMUSCULAR; INTRAVENOUS at 16:27

## 2025-02-04 RX ADMIN — ACETAMINOPHEN 650 MG: 325 TABLET ORAL at 20:24

## 2025-02-04 RX ADMIN — LIDOCAINE HYDROCHLORIDE 100 MG: 20 INJECTION, SOLUTION EPIDURAL; INFILTRATION; INTRACAUDAL; PERINEURAL at 16:18

## 2025-02-04 RX ADMIN — BENZOCAINE AND MENTHOL 1 LOZENGE: 15; 3.6 LOZENGE ORAL at 18:59

## 2025-02-04 RX ADMIN — PROPOFOL 25 MCG/KG/MIN: 10 INJECTION, EMULSION INTRAVENOUS at 16:32

## 2025-02-04 RX ADMIN — SUGAMMADEX 200 MG: 100 INJECTION, SOLUTION INTRAVENOUS at 17:22

## 2025-02-04 RX ADMIN — MAGNESIUM SULFATE HEPTAHYDRATE 2 G: 500 INJECTION, SOLUTION INTRAMUSCULAR; INTRAVENOUS at 16:27

## 2025-02-04 RX ADMIN — SODIUM CHLORIDE 1000 ML: 9 INJECTION, SOLUTION INTRAVENOUS at 11:08

## 2025-02-04 RX ADMIN — Medication 50 MG: at 16:18

## 2025-02-04 RX ADMIN — TAZOBACTAM SODIUM AND PIPERACILLIN SODIUM 3.38 G: 375; 3 INJECTION, SOLUTION INTRAVENOUS at 16:27

## 2025-02-04 RX ADMIN — SUGAMMADEX 200 MG: 100 INJECTION, SOLUTION INTRAVENOUS at 17:10

## 2025-02-04 RX ADMIN — ONDANSETRON 4 MG: 2 INJECTION INTRAMUSCULAR; INTRAVENOUS at 16:27

## 2025-02-04 RX ADMIN — KETOROLAC TROMETHAMINE 30 MG: 30 INJECTION, SOLUTION INTRAMUSCULAR at 17:07

## 2025-02-04 RX ADMIN — MORPHINE SULFATE 4 MG: 4 INJECTION, SOLUTION INTRAMUSCULAR; INTRAVENOUS at 11:07

## 2025-02-04 RX ADMIN — PIPERACILLIN SODIUM AND TAZOBACTAM SODIUM 3.38 G: 3; .375 INJECTION, SOLUTION INTRAVENOUS at 11:08

## 2025-02-04 RX ADMIN — IOHEXOL 75 ML: 350 INJECTION, SOLUTION INTRAVENOUS at 10:36

## 2025-02-04 RX ADMIN — ROSUVASTATIN CALCIUM 5 MG: 5 TABLET, FILM COATED ORAL at 20:24

## 2025-02-04 RX ADMIN — MORPHINE SULFATE 2 MG: 2 INJECTION, SOLUTION INTRAMUSCULAR; INTRAVENOUS at 13:38

## 2025-02-04 RX ADMIN — DEXAMETHASONE SODIUM PHOSPHATE 8 MG: 4 INJECTION, SOLUTION INTRAMUSCULAR; INTRAVENOUS at 16:27

## 2025-02-04 RX ADMIN — Medication 20 MG: at 16:43

## 2025-02-04 RX ADMIN — ONDANSETRON 4 MG: 2 INJECTION INTRAMUSCULAR; INTRAVENOUS at 13:39

## 2025-02-04 RX ADMIN — KETOROLAC TROMETHAMINE 30 MG: 30 INJECTION, SOLUTION INTRAMUSCULAR at 20:25

## 2025-02-04 RX ADMIN — MIDAZOLAM 2 MG: 1 INJECTION INTRAMUSCULAR; INTRAVENOUS at 16:13

## 2025-02-04 RX ADMIN — FENTANYL CITRATE 50 MCG: 50 INJECTION, SOLUTION INTRAMUSCULAR; INTRAVENOUS at 16:18

## 2025-02-04 RX ADMIN — PROPOFOL 200 MG: 10 INJECTION, EMULSION INTRAVENOUS at 16:18

## 2025-02-04 RX ADMIN — SODIUM CHLORIDE, POTASSIUM CHLORIDE, SODIUM LACTATE AND CALCIUM CHLORIDE 100 ML/HR: 600; 310; 30; 20 INJECTION, SOLUTION INTRAVENOUS at 13:37

## 2025-02-04 RX ADMIN — Medication 3 L/MIN: at 17:45

## 2025-02-04 SDOH — SOCIAL STABILITY: SOCIAL INSECURITY: DO YOU FEEL ANYONE HAS EXPLOITED OR TAKEN ADVANTAGE OF YOU FINANCIALLY OR OF YOUR PERSONAL PROPERTY?: NO

## 2025-02-04 SDOH — ECONOMIC STABILITY: FOOD INSECURITY: WITHIN THE PAST 12 MONTHS, THE FOOD YOU BOUGHT JUST DIDN'T LAST AND YOU DIDN'T HAVE MONEY TO GET MORE.: PATIENT DECLINED

## 2025-02-04 SDOH — SOCIAL STABILITY: SOCIAL INSECURITY: ABUSE: ADULT

## 2025-02-04 SDOH — SOCIAL STABILITY: SOCIAL INSECURITY: HAVE YOU HAD THOUGHTS OF HARMING ANYONE ELSE?: NO

## 2025-02-04 SDOH — SOCIAL STABILITY: SOCIAL INSECURITY: WITHIN THE LAST YEAR, HAVE YOU BEEN HUMILIATED OR EMOTIONALLY ABUSED IN OTHER WAYS BY YOUR PARTNER OR EX-PARTNER?: NO

## 2025-02-04 SDOH — SOCIAL STABILITY: SOCIAL INSECURITY
WITHIN THE LAST YEAR, HAVE YOU BEEN KICKED, HIT, SLAPPED, OR OTHERWISE PHYSICALLY HURT BY YOUR PARTNER OR EX-PARTNER?: NO

## 2025-02-04 SDOH — SOCIAL STABILITY: SOCIAL INSECURITY
WITHIN THE LAST YEAR, HAVE YOU BEEN RAPED OR FORCED TO HAVE ANY KIND OF SEXUAL ACTIVITY BY YOUR PARTNER OR EX-PARTNER?: NO

## 2025-02-04 SDOH — SOCIAL STABILITY: SOCIAL INSECURITY: WITHIN THE LAST YEAR, HAVE YOU BEEN AFRAID OF YOUR PARTNER OR EX-PARTNER?: NO

## 2025-02-04 SDOH — ECONOMIC STABILITY: INCOME INSECURITY
IN THE PAST 12 MONTHS HAS THE ELECTRIC, GAS, OIL, OR WATER COMPANY THREATENED TO SHUT OFF SERVICES IN YOUR HOME?: PATIENT DECLINED

## 2025-02-04 SDOH — SOCIAL STABILITY: SOCIAL INSECURITY: HAS ANYONE EVER THREATENED TO HURT YOUR FAMILY OR YOUR PETS?: NO

## 2025-02-04 SDOH — HEALTH STABILITY: MENTAL HEALTH: CURRENT SMOKER: 0

## 2025-02-04 SDOH — ECONOMIC STABILITY: HOUSING INSECURITY: IN THE PAST 12 MONTHS, HOW MANY TIMES HAVE YOU MOVED WHERE YOU WERE LIVING?: 1

## 2025-02-04 SDOH — ECONOMIC STABILITY: FOOD INSECURITY
WITHIN THE PAST 12 MONTHS, YOU WORRIED THAT YOUR FOOD WOULD RUN OUT BEFORE YOU GOT THE MONEY TO BUY MORE.: PATIENT DECLINED

## 2025-02-04 SDOH — ECONOMIC STABILITY: FOOD INSECURITY: HOW HARD IS IT FOR YOU TO PAY FOR THE VERY BASICS LIKE FOOD, HOUSING, MEDICAL CARE, AND HEATING?: PATIENT DECLINED

## 2025-02-04 SDOH — ECONOMIC STABILITY: HOUSING INSECURITY: AT ANY TIME IN THE PAST 12 MONTHS, WERE YOU HOMELESS OR LIVING IN A SHELTER (INCLUDING NOW)?: PATIENT DECLINED

## 2025-02-04 SDOH — SOCIAL STABILITY: SOCIAL INSECURITY: ARE YOU OR HAVE YOU BEEN THREATENED OR ABUSED PHYSICALLY, EMOTIONALLY, OR SEXUALLY BY ANYONE?: NO

## 2025-02-04 SDOH — SOCIAL STABILITY: SOCIAL INSECURITY: DOES ANYONE TRY TO KEEP YOU FROM HAVING/CONTACTING OTHER FRIENDS OR DOING THINGS OUTSIDE YOUR HOME?: NO

## 2025-02-04 SDOH — SOCIAL STABILITY: SOCIAL INSECURITY: ARE THERE ANY APPARENT SIGNS OF INJURIES/BEHAVIORS THAT COULD BE RELATED TO ABUSE/NEGLECT?: NO

## 2025-02-04 SDOH — ECONOMIC STABILITY: TRANSPORTATION INSECURITY
IN THE PAST 12 MONTHS, HAS LACK OF TRANSPORTATION KEPT YOU FROM MEDICAL APPOINTMENTS OR FROM GETTING MEDICATIONS?: PATIENT DECLINED

## 2025-02-04 SDOH — SOCIAL STABILITY: SOCIAL INSECURITY: DO YOU FEEL UNSAFE GOING BACK TO THE PLACE WHERE YOU ARE LIVING?: NO

## 2025-02-04 SDOH — ECONOMIC STABILITY: HOUSING INSECURITY: IN THE LAST 12 MONTHS, WAS THERE A TIME WHEN YOU WERE NOT ABLE TO PAY THE MORTGAGE OR RENT ON TIME?: PATIENT DECLINED

## 2025-02-04 ASSESSMENT — LIFESTYLE VARIABLES
EVER FELT BAD OR GUILTY ABOUT YOUR DRINKING: NO
AUDIT-C TOTAL SCORE: 1
SKIP TO QUESTIONS 9-10: 1
HOW MANY STANDARD DRINKS CONTAINING ALCOHOL DO YOU HAVE ON A TYPICAL DAY: 1 OR 2
HAVE YOU EVER FELT YOU SHOULD CUT DOWN ON YOUR DRINKING: NO
EVER HAD A DRINK FIRST THING IN THE MORNING TO STEADY YOUR NERVES TO GET RID OF A HANGOVER: NO
AUDIT-C TOTAL SCORE: 1
HOW OFTEN DO YOU HAVE A DRINK CONTAINING ALCOHOL: MONTHLY OR LESS
HAVE PEOPLE ANNOYED YOU BY CRITICIZING YOUR DRINKING: NO
HOW OFTEN DO YOU HAVE 6 OR MORE DRINKS ON ONE OCCASION: NEVER
TOTAL SCORE: 0

## 2025-02-04 ASSESSMENT — ENCOUNTER SYMPTOMS
DIARRHEA: 0
FATIGUE: 1
FEVER: 0
CHOKING: 0
LIGHT-HEADEDNESS: 0
BLOOD IN STOOL: 0
ABDOMINAL PAIN: 1
HEMATURIA: 0
RECTAL PAIN: 0
DIFFICULTY URINATING: 0
VOMITING: 0
UNEXPECTED WEIGHT CHANGE: 0
CHILLS: 0
ANAL BLEEDING: 0
WEAKNESS: 0
DYSURIA: 0
FREQUENCY: 0
AGITATION: 0
PALPITATIONS: 0
DIAPHORESIS: 1
APPETITE CHANGE: 1
COUGH: 0
CONSTIPATION: 0
WHEEZING: 0
ACTIVITY CHANGE: 0
NAUSEA: 1
CHEST TIGHTNESS: 0
DIZZINESS: 0
SHORTNESS OF BREATH: 0
ABDOMINAL DISTENTION: 0

## 2025-02-04 ASSESSMENT — COGNITIVE AND FUNCTIONAL STATUS - GENERAL
MOBILITY SCORE: 24
PATIENT BASELINE BEDBOUND: YES
DAILY ACTIVITIY SCORE: 24

## 2025-02-04 ASSESSMENT — ACTIVITIES OF DAILY LIVING (ADL)
LACK_OF_TRANSPORTATION: PATIENT DECLINED
ADEQUATE_TO_COMPLETE_ADL: YES
JUDGMENT_ADEQUATE_SAFELY_COMPLETE_DAILY_ACTIVITIES: YES
PATIENT'S MEMORY ADEQUATE TO SAFELY COMPLETE DAILY ACTIVITIES?: YES
TOILETING: INDEPENDENT
HEARING - RIGHT EAR: FUNCTIONAL
DRESSING YOURSELF: INDEPENDENT
HEARING - LEFT EAR: FUNCTIONAL
FEEDING YOURSELF: INDEPENDENT
BATHING: INDEPENDENT
GROOMING: INDEPENDENT
WALKS IN HOME: INDEPENDENT
LACK_OF_TRANSPORTATION: PATIENT DECLINED

## 2025-02-04 ASSESSMENT — PAIN SCALES - WONG BAKER: WONGBAKER_NUMERICALRESPONSE: HURTS EVEN MORE

## 2025-02-04 ASSESSMENT — COLUMBIA-SUICIDE SEVERITY RATING SCALE - C-SSRS
6. HAVE YOU EVER DONE ANYTHING, STARTED TO DO ANYTHING, OR PREPARED TO DO ANYTHING TO END YOUR LIFE?: NO
1. IN THE PAST MONTH, HAVE YOU WISHED YOU WERE DEAD OR WISHED YOU COULD GO TO SLEEP AND NOT WAKE UP?: NO

## 2025-02-04 ASSESSMENT — PAIN - FUNCTIONAL ASSESSMENT
PAIN_FUNCTIONAL_ASSESSMENT: 0-10

## 2025-02-04 ASSESSMENT — PAIN SCALES - GENERAL
PAINLEVEL_OUTOF10: 9
PAINLEVEL_OUTOF10: 7
PAINLEVEL_OUTOF10: 5 - MODERATE PAIN
PAINLEVEL_OUTOF10: 3
PAINLEVEL_OUTOF10: 2
PAINLEVEL_OUTOF10: 5 - MODERATE PAIN
PAIN_LEVEL: 0
PAINLEVEL_OUTOF10: 0 - NO PAIN
PAINLEVEL_OUTOF10: 5 - MODERATE PAIN
PAINLEVEL_OUTOF10: 0 - NO PAIN

## 2025-02-04 ASSESSMENT — PAIN DESCRIPTION - FREQUENCY: FREQUENCY: CONSTANT/CONTINUOUS

## 2025-02-04 ASSESSMENT — PAIN SCALES - PAIN ASSESSMENT IN ADVANCED DEMENTIA (PAINAD)
CONSOLABILITY: NO NEED TO CONSOLE
TOTALSCORE: 3
BODYLANGUAGE: RELAXED
BREATHING: NORMAL
NEGVOCALIZATION: OCCASIONAL MOAN/GROAN, LOW SPEECH, NEGATIVE/DISAPPROVING QUALITY
FACIALEXPRESSION: FACIAL GRIMACING

## 2025-02-04 ASSESSMENT — PAIN DESCRIPTION - LOCATION
LOCATION: ABDOMEN
LOCATION: ABDOMEN

## 2025-02-04 ASSESSMENT — PAIN DESCRIPTION - PAIN TYPE: TYPE: ACUTE PAIN

## 2025-02-04 ASSESSMENT — PATIENT HEALTH QUESTIONNAIRE - PHQ9
1. LITTLE INTEREST OR PLEASURE IN DOING THINGS: NOT AT ALL
SUM OF ALL RESPONSES TO PHQ9 QUESTIONS 1 & 2: 0
2. FEELING DOWN, DEPRESSED OR HOPELESS: NOT AT ALL

## 2025-02-04 ASSESSMENT — PAIN DESCRIPTION - PROGRESSION: CLINICAL_PROGRESSION: NOT CHANGED

## 2025-02-04 ASSESSMENT — PAIN DESCRIPTION - ORIENTATION: ORIENTATION: ANTERIOR

## 2025-02-04 NOTE — OP NOTE
APPENDECTOMY, LAPAROSCOPIC Operative Note     Date: 2025  OR Location: [facility] OR    Name: Christina Burger, : 1965, Age: 59 y.o., MRN: [id number], Sex: female    Diagnosis  Pre-op Diagnosis      * Acute appendicitis, unspecified acute appendicitis type [K35.80] Post-op Diagnosis     * Acute appendicitis, unspecified acute appendicitis type [K35.80]     Procedures  APPENDECTOMY, LAPAROSCOPIC  52299 - SC LAPAROSCOPIC APPENDECTOMY      Surgeons      * Braulio Raines - Primary    Resident/Fellow/Other Assistant:  Surgeons and Role:  * No surgeons found with a matching role *    Staff:   Surgical Assistant:   Circulator: Sugar Bustamante Person: Meghan Bustamante Person: Jamil    Anesthesia Staff: Anesthesiologist: DO KHOI IbarraAA: ROD Campbell    Procedure Summary  Anesthesia: Anesthesia type not filed in the log.  ASA: ASA status not filed in the log.  Estimated Blood Loss: 5mL  Intra-op Medications:   Administrations occurring from 1600 to 1755 on 25:   Medication Name Total Dose   BUPivacaine-EPINEPHrine (Marcaine w/EPI) 0.5 %-1:200,000 injection 30 mL   sodium chloride 0.9 % irrigation solution 1,000 mL   dexAMETHasone (Decadron) injection 4 mg/mL 8 mg   diphenhydrAMINE (BENADryl) injection 25 mg   famotidine PF (Pepcid) injection 20 mg   fentaNYL (Sublimaze) injection 50 mcg/mL 50 mcg   ketorolac (Toradol) injection 30 mg 30 mg   lactated Ringer's infusion Cannot be calculated   lidocaine PF (Xylocaine-MPF) local injection 2 % 100 mg   magnesium sulfate 50 % injection 2 g   midazolam (Versed) injection 1 mg/mL 2 mg   ondansetron (Zofran) 2 mg/mL injection 4 mg   piperacillin-tazobactam (Zosyn) IV 3.375 g in 50 mL dextrose (iso) - premix 3.375 g   propofol (Diprivan) injection 10 mg/mL 342.95 mg   rocuronium (Zemuron) 50 mg/5 mL prefilled syringe 70 mg   sugammadex (Bridion) 200 mg/2 mL injection 200 mg              Anesthesia Record               Intraprocedure I/O Totals          Intake     lactated Ringer's infusion 1000.00 mL    piperacillin-tazobactam 3.375 gram/50 mL 50.00 mL    Total Intake 1050 mL          Specimen:   ID Type Source Tests Collected by Time   1 : APPENDIX Tissue APPENDIX SURGICAL PATHOLOGY EXAM Braulio Raines MD 2/4/2025 1640                 Drains and/or Catheters:   [REMOVED] Urethral Catheter Non-latex 16 Fr. (Removed)       Tourniquet Times:         Implants:     Findings: Acutely inflamed appendix without gangrene or perforation.    Indications: Christina Burger is an 59 y.o. female who is having surgery for Acute appendicitis, unspecified acute appendicitis type [K35.80].     The patient was seen in the preoperative area. The risks, benefits, complications, treatment options, non-operative alternatives, expected recovery and outcomes were discussed with the patient. The possibilities of reaction to medication, pulmonary aspiration, injury to surrounding structures, bleeding, recurrent infection, the need for additional procedures, failure to diagnose a condition, and creating a complication requiring transfusion or operation were discussed with the patient. The patient concurred with the proposed plan, giving informed consent.  The site of surgery was properly noted/marked if necessary per policy. The patient has been actively warmed in preoperative area. Preoperative antibiotics have been ordered and given within 1 hours of incision. Venous thrombosis prophylaxis have been ordered including bilateral sequential compression devices    Procedure Details: Safety checklist was performed in the preoperative area confirming correct patient and correct procedure.  The patient was brought to the operating room.  Sequential compression devices were applied to bilateral lower extremities.  Following induction of general anesthesia the patient was placed in supine position with left arm tucked and right arm abducted and gently fixed to arm board.  Careful attention was paid to  appropriate padding of pressure points.  A 16Fr garcia catheter was inserted into the bladder using sterile technique.  The abdominal wall was prepped with chloraprep and the patient draped in the usual sterile fashion.  A time out was performed, once again confirming correct patient and correct procedure.      Following completion of perioperative antibiotics, a small stab incision was created in the left upper quadrant using the #15 scalpel blade.  The zero degree 5mm laparoscope loaded into a 5mm optical trocar was used to enter the abdominal cavity using Optiview technique.  There were no injuries incurred to the intra-abdominal viscera.  Carbon dioxide pneuoperitoneum was established.  The patient was then placed in trendelenberg and left side tilted down position.  The appendix was visualized in the right lower quadrant.  It was acutely inflamed but without perforation or gangrene.  A 5mm laparoscopic port was now inserted in the supra-pubic midline position under direct laparoscopic vision.  The original 5mm laparoscopic port was now exchanged for a 12mm trocar under direct vision.  The 5mm laparoscope was then inserted in the left baltazar-abdomen under direct laparoscopic vision.      The appendix was grasped and retracted anteriorly.  The mesoappendix was divided using laparoscopic ligasure.  The base of the appendix was cleared to the cecum.  A 45mm blue load on the powered laparoscopic stapler was then brought across the base of the appendix, closed, and fired to divide the appendix.  The staple line was inspected and was both hemostatic and intact.  The divided appendix was then placed into a laparoscopic specimen bag.  The right lower quadrant was once again inspected and found to be hemostatic and without other findings.      The patient was brought out of trendelenberg and left side down position.  The specimen along with the 12mm port was removed from the left upper quadrant.  The 12mm port was then  replaced under direct vision.  The left upper quadrant port was now closed using the Sergio Magana device and a figure-of-eight 0 vicryl tie.  The laparoscopic ports were now removed and there was good hemostasis at the port entry sites.  Carbon dioxide pneumoperitoneum was released.  A count of surgical instruments was performed and confirmed to be correct.  The subcutaneous tissue at the laparoscopic incisions was anesthetized with additional local anesthetic.  The skin was then closed with buried 4-0 monocryl stitch.  The abdomen was cleansed with a moistened sponge and dried.  Glue was applied over the incisions.  The bladder garcia catheter was removed at the end of the case.  A sign out was performed.  The patient was awakened and taken to the recovery area in stable condition.    Complications:  None; patient tolerated the procedure well.    Disposition: PACU - hemodynamically stable.  Condition: stable         Task Performed by RNFA or Surgical Assistant:  Positioning, garcia catheter insertion, skin prep.          Additional Details: N/A    Attending Attestation: I was present and scrubbed for the entire procedure.    Braulio Raines  Phone Number: [phone]

## 2025-02-04 NOTE — ANESTHESIA PROCEDURE NOTES
Airway  Date/Time: 2/4/2025 4:22 PM  Urgency: elective    Airway not difficult    Staffing  Performed: ROD   Authorized by: Stalin Parker DO    Performed by: ROD Campbell  Patient location during procedure: OR    Indications and Patient Condition  Indications for airway management: anesthesia  Spontaneous ventilation: present  Preoxygenated: yes  Patient position: sniffing  Mask difficulty assessment: 1 - vent by mask    Final Airway Details  Final airway type: endotracheal airway      Successful airway: ETT     Successful intubation technique: video laryngoscopy (garcia 3)  Facilitating devices/methods: intubating stylet  Endotracheal tube insertion site: oral  Blade: Chad  Blade size: #3  ETT size (mm): 7.0  Cormack-Lehane Classification: grade I - full view of glottis  Placement verified by: chest auscultation and capnometry   Measured from: gums  ETT to gums (cm): 22  Number of attempts at approach: 1

## 2025-02-04 NOTE — ANESTHESIA PREPROCEDURE EVALUATION
Patient: Christina Burger    Procedure Information       Anesthesia Start Date/Time: 02/04/25 1613    Procedure: APPENDECTOMY, LAPAROSCOPIC    Location: [facility] OR 07 / [facility]    Surgeons: Braulio Raines MD            Relevant Problems   Neuro   (+) Anxiety   (+) Depression      GI   (+) Irritable bowel syndrome      /Renal   (+) Urinary tract infection      HEENT   (+) Asymmetric SNHL (sensorineural hearing loss)      ID   (+) Opportunistic mycosis (Multi)   (+) Urinary tract infection       Clinical information reviewed:   Tobacco  Allergies  Meds   Med Hx  Surg Hx  OB Status  Fam Hx  Soc   Hx        NPO Detail:  No data recorded     Physical Exam    Airway  Mallampati: II     Cardiovascular - normal exam  Rhythm: regular  Rate: normal     Dental - normal exam     Pulmonary - normal exam     Abdominal - normal exam  Abdomen: soft             Anesthesia Plan    History of general anesthesia?: yes  History of complications of general anesthesia?: no    ASA 2     The patient is not a current smoker.  Patient was previously instructed to abstain from smoking on day of procedure.  Patient did not smoke on day of procedure.  Education provided regarding risk of obstructive sleep apnea.  intravenous induction   Postoperative administration of opioids is intended.  Anesthetic plan and risks discussed with patient.  Use of blood products discussed with patient who.    Plan discussed with CAA.

## 2025-02-04 NOTE — H&P
History Of Present Illness  Christina Burger is a 59 y.o. female presenting for evaluation of acute onset abdominal pain.  Pain awoke patient from sleep around midnight 2/30/2025.  Pain was initially described as generalized throughout the entire abdomen.  Patient was able to ultimately return to sleep but upon awakening, pain had localized down to the right lower quadrant.  Pain rated 6 out of 10 and constant.  Pain associated with nausea but no emesis.  Patient reports some sweats but otherwise no documented fevers and denies chills.  Last bowel movement was prior to pain onset around 2/2 or 2/3/2025.  Last bowel movement described as brown.  Denies diarrhea, hematochezia, melena.  Patient denies dysuria or hematuria.  Patient has not eaten since 2/3/2025 at approximately 1900 hrs.  Patient does report some fatigue and diminished appetite.    At time of arrival, patient afebrile with temperature of 36.7 °C.  Heart rate 80.  Blood pressure 136/78.  Respiratory rate 15 and saturating 97% on room air.  CBC notable for leukocytosis to 15.9.  Hemoglobin and hematocrit are within normal limits at 12.5 and 37.9.  Platelet count also within normal limits at 308.  Comprehensive metabolic panel notable for mildly elevated glucose to 106 but otherwise creatinine within normal limits and liver function testing as well as serum lipase unremarkable.  Microscopic urine notable for 6-10 white blood cells and 1+ bacteria.  Urinalysis positive for leukocyte esterase.  Further evaluation was performed with CT of the abdomen and pelvis with results listed below.  There is acute appendicitis.  Surgery was consulted.    Past medical history: Hyperlipidemia, depression, obesity, BMI 37.2  Past surgical history: Laparoscopic cholecystectomy (2018), remote laparoscopic surgery for tubo-ovarian abscess  Social history: Denies smoking, occasional alcohol use, denies illicit substance use  Family history: Negative for CRC or IBD     Past  Medical History  Past Medical History:   Diagnosis Date    Acute bilateral low back pain with left-sided sciatica 2023    Anxiety     Depression     Follicular cyst of the skin and subcutaneous tissue, unspecified 06/10/2018    Scalp cyst    Other conditions influencing health status      3    Other specified disorders of eustachian tube, right ear 2022    Dysfunction of right eustachian tube    Personal history of other diseases of the digestive system 2018    History of biliary colic    Seasonal allergies        Surgical History  Past Surgical History:   Procedure Laterality Date    BREAST BIOPSY      OTHER SURGICAL HISTORY  2018    Cholecystectomy laparoscopic    OTHER SURGICAL HISTORY  09/10/2019    Breast surgery        Social History  She reports that she has never smoked. She has never used smokeless tobacco. She reports current alcohol use. She reports that she does not use drugs.    Family History  No family history on file.     Allergies  Patient has no known allergies.    Review of Systems   Constitutional:  Positive for appetite change, diaphoresis and fatigue. Negative for activity change, chills, fever and unexpected weight change.   Respiratory:  Negative for cough, choking, chest tightness, shortness of breath and wheezing.    Cardiovascular:  Negative for chest pain, palpitations and leg swelling.   Gastrointestinal:  Positive for abdominal pain and nausea. Negative for abdominal distention, anal bleeding, blood in stool, constipation, diarrhea, rectal pain and vomiting.   Genitourinary:  Negative for difficulty urinating, dysuria, frequency and hematuria.   Neurological:  Negative for dizziness, weakness and light-headedness.   Psychiatric/Behavioral:  Negative for agitation.         Physical Exam  Constitutional:       General: She is not in acute distress.     Appearance: Normal appearance. She is not ill-appearing.   HENT:      Head: Normocephalic.      Right Ear:  "External ear normal.      Left Ear: External ear normal.      Nose: Nose normal.      Mouth/Throat:      Mouth: Mucous membranes are moist.   Eyes:      Extraocular Movements: Extraocular movements intact.      Conjunctiva/sclera: Conjunctivae normal.   Cardiovascular:      Rate and Rhythm: Normal rate and regular rhythm.      Pulses: Normal pulses.      Heart sounds: Normal heart sounds. No murmur heard.  Pulmonary:      Effort: Pulmonary effort is normal. No respiratory distress.      Breath sounds: Normal breath sounds. No wheezing, rhonchi or rales.   Chest:      Chest wall: No tenderness.   Abdominal:      General: There is no distension.      Palpations: Abdomen is soft. There is no mass.      Tenderness: There is abdominal tenderness. There is no guarding or rebound.      Hernia: No hernia is present.      Comments: TTP primarily in RLQ.   Musculoskeletal:         General: No swelling or deformity.      Cervical back: Neck supple. No rigidity.      Right lower leg: No edema.      Left lower leg: No edema.   Skin:     General: Skin is warm.      Coloration: Skin is not jaundiced or pale.   Neurological:      Mental Status: She is alert.          Last Recorded Vitals  Blood pressure 128/68, pulse 70, temperature 36.7 °C (98.1 °F), temperature source Temporal, resp. rate 18, height 1.6 m (5' 3\"), weight 95.3 kg (210 lb), SpO2 95%.    Relevant Results      Results for orders placed or performed during the hospital encounter of 02/04/25 (from the past 24 hours)   Urinalysis with Reflex Culture and Microscopic   Result Value Ref Range    Color, Urine Light-Yellow Light-Yellow, Yellow, Dark-Yellow    Appearance, Urine Clear Clear    Specific Gravity, Urine 1.014 1.005 - 1.035    pH, Urine 5.5 5.0, 5.5, 6.0, 6.5, 7.0, 7.5, 8.0    Protein, Urine NEGATIVE NEGATIVE, 10 (TRACE), 20 (TRACE) mg/dL    Glucose, Urine Normal Normal mg/dL    Blood, Urine NEGATIVE NEGATIVE mg/dL    Ketones, Urine NEGATIVE NEGATIVE mg/dL    " Bilirubin, Urine NEGATIVE NEGATIVE mg/dL    Urobilinogen, Urine Normal Normal mg/dL    Nitrite, Urine NEGATIVE NEGATIVE    Leukocyte Esterase, Urine 75 [place]/uL (A) NEGATIVE   Microscopic Only, Urine   Result Value Ref Range    WBC, Urine 6-10 (A) 1-5, NONE /HPF    RBC, Urine 1-2 NONE, 1-2, 3-5 /HPF    Bacteria, Urine 1+ (A) NONE SEEN /HPF    Mucus, Urine FEW Reference range not established. /LPF   CBC with Differential   Result Value Ref Range    WBC 15.9 (H) 4.4 - 11.3 x10*3/uL    nRBC 0.0 0.0 - 0.0 /100 WBCs    RBC 4.21 4.00 - 5.20 x10*6/uL    Hemoglobin 12.5 12.0 - 16.0 g/dL    Hematocrit 37.9 36.0 - 46.0 %    MCV 90 80 - 100 fL    MCH 29.7 26.0 - 34.0 pg    MCHC 33.0 32.0 - 36.0 g/dL    RDW 12.4 11.5 - 14.5 %    Platelets 308 150 - 450 x10*3/uL    Neutrophils % 75.2 40.0 - 80.0 %    Immature Granulocytes %, Automated 0.4 0.0 - 0.9 %    Lymphocytes % 16.5 13.0 - 44.0 %    Monocytes % 6.0 2.0 - 10.0 %    Eosinophils % 1.3 0.0 - 6.0 %    Basophils % 0.6 0.0 - 2.0 %    Neutrophils Absolute 11.92 (H) 1.20 - 7.70 x10*3/uL    Immature Granulocytes Absolute, Automated 0.06 0.00 - 0.70 x10*3/uL    Lymphocytes Absolute 2.62 1.20 - 4.80 x10*3/uL    Monocytes Absolute 0.95 0.10 - 1.00 x10*3/uL    Eosinophils Absolute 0.21 0.00 - 0.70 x10*3/uL    Basophils Absolute 0.10 0.00 - 0.10 x10*3/uL   Comprehensive Metabolic Panel   Result Value Ref Range    Glucose 106 (H) 74 - 99 mg/dL    Sodium 139 136 - 145 mmol/L    Potassium 4.0 3.5 - 5.3 mmol/L    Chloride 102 98 - 107 mmol/L    Bicarbonate 28 21 - 32 mmol/L    Anion Gap 13 10 - 20 mmol/L    Urea Nitrogen 16 6 - 23 mg/dL    Creatinine 0.64 0.50 - 1.05 mg/dL    eGFR >90 >60 mL/min/1.73m*2    Calcium 9.4 8.6 - 10.3 mg/dL    Albumin 4.6 3.4 - 5.0 g/dL    Alkaline Phosphatase 116 33 - 136 U/L    Total Protein 8.0 6.4 - 8.2 g/dL    AST 14 9 - 39 U/L    Bilirubin, Total 0.5 0.0 - 1.2 mg/dL    ALT 15 7 - 45 U/L   Lipase   Result Value Ref Range    Lipase 9 9 - 82 U/L     Interpreted  By:  Feliciano Alicea,   STUDY:  CT ABDOMEN PELVIS W IV CONTRAST;  2/4/2025 10:50 am      INDICATION:  60 y/o   F with  Signs/Symptoms:Status post cholecystectomy, right  lower and upper quadrant pain..          LIMITATIONS:  None.      ACCESSION NUMBER(S):  [id number]      ORDERING CLINICIAN:  VALERIO ALDRICH      TECHNIQUE:  After the administration of   oral water and IV nonionic contrast,  spiral axial images were obtained from the xiphoid down through the  symphysis pubis. Sagittal and coronal reconstruction images were  generated. Bone, mediastinal, lung, and liver windows were reviewed.  Omnipaque 350   75 ML      COMPARISON:  Prior exam from 12/23/2015..      FINDINGS:  LUNG BASES:  Cardiomegaly. No pericardial effusion. No pleural effusion. There is  mild patchy hazy ground-glass density at the lung bases. There are  several tiny noncalcified bibasilar lung nodules for example on image  60 of the lung windows. These are more numerous on the left than on  the right. These are grossly stable.      LIVER:  Hepatomegaly, with the liver measuring 19.1 cm on the right  Liver density was  within the limits of normal.  No  liver lesion evident in this  exam.      GALLBLADDER:  Surgically absent.      BILE DUCTS:  No intrahepatic biliary ductal dilatation.  Common bile duct was  within the limits of normal.      SPLEEN:  No splenomegaly.  No splenic mass..      PANCREAS:  No pancreatic mass or inflammation, or ductal dilatation.      KIDNEYS/ADRENALS:  No adrenal mass or enlargement.  There is extrarenal pelvis on the right. There is dilatation of the  left renal collecting system ending abruptly at the left  ureteropelvic junction, unchanged. There is no adjacent edema on the  left. There is no calcified stone on either side. No renal mass on  either side. No right-sided perinephric edema. No ureteral stone or  dilatation.      BLADDER/PELVIS:  Urinary bladder was grossly intact.  No uterine enlargement or gross  adnexal mass.      GREAT VESSELS/RETROPERITONEUM:  Abdominal aorta and IVC were intact.  No suspicious retroperitoneal adenopathy.  No suspicious mesenteric adenopathy.  No suspicious pelvic or inguinal adenopathy.      PERITONEUM:  No ascites.  No pneumoperitoneum.  No peritoneal or mesenteric mass or inflammation.      BOWEL:  Tiny hiatal hernia. Otherwise, the stomach was  grossly intact.  There was no small bowel dilatation or small bowel wall thickening.  No small-bowel obstruction. There was no colonic wall thickening or  large bowel obstruction.  No edema adjacent to the colon. The cecal  appendix is enlarged at 12 mm diameter with wall thickening and  adjacent fat stranding. There is no extraluminal gas or focal fluid  collection.      BONES:  No destructive lytic or blastic bone lesion. Vacuum disc phenomenon  at L5-S1. Grade 1 anterolisthesis of L4 over L5 and of L5 over S1.  Inter facet hypertrophy with spurring from L3-4 through L5-S1.      ABDOMINAL WALL:  Unremarkable.      IMPRESSION:  CT findings consistent with acute appendicitis. There is currently no  free air or abscess or obstruction.      Previous cholecystectomy.      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  Incidental extrarenal pelvis on the right.      Tiny hiatal hernia.      Patchy ground-glass infiltrative densities at both lung bases,  suspicious for mild pneumonia.      Bibasilar subcentimeter noncalcified stable lung nodules, most likely  sequela of previous infection or inflammation. These are more  numerous on the left than on the right.      Arthritic changes in the lumbosacral spine as described.      Mild hepatomegaly.     Assessment/Plan   Assessment & Plan  Acute appendicitis    Acute appendicitis, unspecified acute appendicitis type    #Acute appendicitis  -  Admit to surgery  -  To OR for laparoscopic possible open appendectomy  -  R/B/A reviewed with patient  -  Continue NPO w/ IVF  -  Already  received zosyn antibiotic  -  Anticipate in OR around 1600HRS    #Urinary tract infection  -  Already received zosyn antibiotic for above  -  Await urine culture results       I spent 60 minutes in the professional and overall care of this patient.      Braulio Raines MD

## 2025-02-04 NOTE — ED PROVIDER NOTES
HPI   Chief Complaint   Patient presents with    Abdominal Pain     RLQ since        Patient is a 59-year-old female with history of hyperlipidemia presenting [facility] ED for worsening right lower quadrant pain since .  Patient reports that it began as around the umbilicus, then migrated to the right lower quadrant, with significant increasing severity over the last few days.  Patient denies any element of chest pain, shortness of breath, nausea, vomiting, dysuria, blood in the urine or stool, or loss of function.              Patient History   Past Medical History:   Diagnosis Date    Acute bilateral low back pain with left-sided sciatica 2023    Anxiety     Depression     Follicular cyst of the skin and subcutaneous tissue, unspecified 06/10/2018    Scalp cyst    Other conditions influencing health status      3    Other specified disorders of eustachian tube, right ear 2022    Dysfunction of right eustachian tube    Personal history of other diseases of the digestive system 2018    History of biliary colic    Seasonal allergies      Past Surgical History:   Procedure Laterality Date    BREAST BIOPSY      OTHER SURGICAL HISTORY  2018    Cholecystectomy laparoscopic    OTHER SURGICAL HISTORY  09/10/2019    Breast surgery     No family history on file.  Social History     Tobacco Use    Smoking status: Never    Smokeless tobacco: Never   Vaping Use    Vaping status: Never Used   Substance Use Topics    Alcohol use: Yes     Comment: socially    Drug use: Never       Physical Exam   ED Triage Vitals [25 0833]   Temperature Heart Rate Respirations BP   36.7 °C (98.1 °F) 80 15 136/78      Pulse Ox Temp Source Heart Rate Source Patient Position   97 % Temporal Monitor Sitting      BP Location FiO2 (%)     Right arm --       Physical Exam  Constitutional:       Appearance: Normal appearance.   HENT:      Head: Normocephalic and atraumatic.      Nose: Nose normal.       Mouth/Throat:      Mouth: Mucous membranes are moist.      Pharynx: Oropharynx is clear.   Eyes:      Extraocular Movements: Extraocular movements intact.      Conjunctiva/sclera: Conjunctivae normal.      Pupils: Pupils are equal, round, and reactive to light.   Cardiovascular:      Rate and Rhythm: Normal rate and regular rhythm.      Pulses: Normal pulses.      Heart sounds: Normal heart sounds.   Pulmonary:      Effort: Pulmonary effort is normal.      Breath sounds: Normal breath sounds.   Abdominal:      General: Abdomen is flat. Bowel sounds are normal.      Palpations: Abdomen is soft.      Tenderness: There is abdominal tenderness in the right upper quadrant, right lower quadrant and epigastric area.   Musculoskeletal:         General: Normal range of motion.      Cervical back: Normal range of motion and neck supple.   Skin:     General: Skin is warm and dry.      Capillary Refill: Capillary refill takes less than 2 seconds.   Neurological:      General: No focal deficit present.      Mental Status: She is alert and oriented to person, place, and time. Mental status is at baseline.   Psychiatric:         Mood and Affect: Mood normal.         Behavior: Behavior normal.           ED Course & MDM   ED Course as of 02/09/25 1823   e Feb 04, 2025   0856 Declined pain meds  [FN]   0858 Bacteria, Urine(!): 1+  Given no complaint of dysuria or urinary urgency or frequency unclear if this is a UTI.  Will wait for CT abdomen pelvis prior to initiating antibiotics [FN]   0904 After reevaluation patient continues to have right lower quadrant pain.  Given her anorexia, leukocytosis, asymptomatic bacteriuria there is significant concern for appendicitis.  Patient has not had any food since yesterday.  Will give IV fluids and IV antibiotics empirically. [FN]   0930 Called CT to expedite imaging  [FN]      ED Course User Index  [FN] Ivy Pate MD         Diagnoses as of 02/09/25 1823   Acute appendicitis, unspecified  acute appendicitis type                 No data recorded     Monica Coma Scale Score: 15 (02/04/25 0834 : Taina Pearson RN)                           Medical Decision Making  Patient is a 59 y.o. female who presents to [facility] ED for Abdominal Pain (RLQ since sunday). On initial ED evaluation, patient found to be in no acute distress. Per HPI, concern to evaluate and treat for differential diagnosis including, but not limited to appendicitis, cholangitis, pancreatitis, pyelonephritis, nephrolithiasis.  Patient is status post cholecystectomy.  Obtaining abdominal lab workup and imaging.  Offered patient pain/nausea medication, deferred at this time.  CBC notable for leukocytosis of 15.9.  CMP showed no significant OBED or electrolyte abnormality.  Patient lipase within normal limits.  Low concern for pancreatitis at this time.  UA showed minor signs of infection.  CT imaging notable for findings suggestive of acute appendicitis.  Patient empirically covered with IV Zosyn.  Patient resuscitated with 1 L normal saline fluids.  Patient last oral intake was at approximately 7 PM yesterday.  Patient reassessed, given 4 mg morphine for pain control.  On-call general surgery Dr. Raines was consulted, and agreed to take patient onto his service for appendectomy.  Diagnostic findings and treatment plan discussed with patient.  Patient amenable to plan.        Procedure  Procedures     Avinash Peterson MD  Resident  02/04/25 [id number]       Ivy Pate MD  02/09/25 [id number]

## 2025-02-04 NOTE — CARE PLAN
The patient's goals for the shift include pain control     The clinical goals for the shift include pain control

## 2025-02-04 NOTE — ANESTHESIA POSTPROCEDURE EVALUATION
Patient: Christina Burger    Procedure Summary       Date: 02/04/25 Room / Location: [facility] OR 07 / Virtual [facility] OR    Anesthesia Start: 1613 Anesthesia Stop: 1737    Procedure: APPENDECTOMY, LAPAROSCOPIC Diagnosis:       Acute appendicitis, unspecified acute appendicitis type      (Acute appendicitis, unspecified acute appendicitis type [K35.80])    Surgeons: Braulio Raines MD Responsible Provider: Stalin Parker DO    Anesthesia Type: general ASA Status: 2            Anesthesia Type: general    Vitals Value Taken Time   /55 02/04/25 1736   Temp 36.2 °C (97.2 °F) 02/04/25 1736   Pulse 69 02/04/25 1745   Resp 14 02/04/25 1745   SpO2 97 % 02/04/25 1745       Anesthesia Post Evaluation    Patient location during evaluation: PACU  Patient participation: complete - patient participated  Level of consciousness: awake  Pain score: 0  Pain management: adequate  Multimodal analgesia pain management approach  Airway patency: patent  Two or more strategies used to mitigate risk of obstructive sleep apnea  Cardiovascular status: acceptable  Respiratory status: acceptable  Hydration status: acceptable  Postoperative Nausea and Vomiting: none        No notable events documented.

## 2025-02-05 ENCOUNTER — PHARMACY VISIT (OUTPATIENT)
Dept: PHARMACY | Facility: CLINIC | Age: 60
End: 2025-02-05
Payer: COMMERCIAL

## 2025-02-05 VITALS
WEIGHT: 210 LBS | TEMPERATURE: 97.7 F | HEART RATE: 71 BPM | OXYGEN SATURATION: 96 % | DIASTOLIC BLOOD PRESSURE: 65 MMHG | SYSTOLIC BLOOD PRESSURE: 119 MMHG | HEIGHT: 63 IN | BODY MASS INDEX: 37.21 KG/M2 | RESPIRATION RATE: 18 BRPM

## 2025-02-05 LAB
ANION GAP SERPL CALC-SCNC: 11 MMOL/L (ref 10–20)
BACTERIA UR CULT: NORMAL
BUN SERPL-MCNC: 16 MG/DL (ref 6–23)
CALCIUM SERPL-MCNC: 8.8 MG/DL (ref 8.6–10.3)
CHLORIDE SERPL-SCNC: 105 MMOL/L (ref 98–107)
CO2 SERPL-SCNC: 28 MMOL/L (ref 21–32)
CREAT SERPL-MCNC: 0.59 MG/DL (ref 0.5–1.05)
EGFRCR SERPLBLD CKD-EPI 2021: >90 ML/MIN/1.73M*2
ERYTHROCYTE [DISTWIDTH] IN BLOOD BY AUTOMATED COUNT: 12.3 % (ref 11.5–14.5)
GLUCOSE SERPL-MCNC: 136 MG/DL (ref 74–99)
HCT VFR BLD AUTO: 34.6 % (ref 36–46)
HGB BLD-MCNC: 11.1 G/DL (ref 12–16)
MCH RBC QN AUTO: 29.6 PG (ref 26–34)
MCHC RBC AUTO-ENTMCNC: 32.1 G/DL (ref 32–36)
MCV RBC AUTO: 92 FL (ref 80–100)
NRBC BLD-RTO: 0 /100 WBCS (ref 0–0)
PLATELET # BLD AUTO: 306 X10*3/UL (ref 150–450)
POTASSIUM SERPL-SCNC: 4.4 MMOL/L (ref 3.5–5.3)
RBC # BLD AUTO: 3.75 X10*6/UL (ref 4–5.2)
SODIUM SERPL-SCNC: 140 MMOL/L (ref 136–145)
WBC # BLD AUTO: 15.1 X10*3/UL (ref 4.4–11.3)

## 2025-02-05 PROCEDURE — 96376 TX/PRO/DX INJ SAME DRUG ADON: CPT

## 2025-02-05 PROCEDURE — G0378 HOSPITAL OBSERVATION PER HR: HCPCS

## 2025-02-05 PROCEDURE — 2500000004 HC RX 250 GENERAL PHARMACY W/ HCPCS (ALT 636 FOR OP/ED): Performed by: NURSE PRACTITIONER

## 2025-02-05 PROCEDURE — 36415 COLL VENOUS BLD VENIPUNCTURE: CPT | Performed by: STUDENT IN AN ORGANIZED HEALTH CARE EDUCATION/TRAINING PROGRAM

## 2025-02-05 PROCEDURE — 2500000002 HC RX 250 W HCPCS SELF ADMINISTERED DRUGS (ALT 637 FOR MEDICARE OP, ALT 636 FOR OP/ED): Performed by: STUDENT IN AN ORGANIZED HEALTH CARE EDUCATION/TRAINING PROGRAM

## 2025-02-05 PROCEDURE — 2500000001 HC RX 250 WO HCPCS SELF ADMINISTERED DRUGS (ALT 637 FOR MEDICARE OP): Performed by: STUDENT IN AN ORGANIZED HEALTH CARE EDUCATION/TRAINING PROGRAM

## 2025-02-05 PROCEDURE — 85027 COMPLETE CBC AUTOMATED: CPT | Performed by: STUDENT IN AN ORGANIZED HEALTH CARE EDUCATION/TRAINING PROGRAM

## 2025-02-05 PROCEDURE — RXMED WILLOW AMBULATORY MEDICATION CHARGE

## 2025-02-05 PROCEDURE — 2500000004 HC RX 250 GENERAL PHARMACY W/ HCPCS (ALT 636 FOR OP/ED)

## 2025-02-05 PROCEDURE — 96366 THER/PROPH/DIAG IV INF ADDON: CPT

## 2025-02-05 PROCEDURE — 80048 BASIC METABOLIC PNL TOTAL CA: CPT | Performed by: STUDENT IN AN ORGANIZED HEALTH CARE EDUCATION/TRAINING PROGRAM

## 2025-02-05 PROCEDURE — 99232 SBSQ HOSP IP/OBS MODERATE 35: CPT | Performed by: NURSE PRACTITIONER

## 2025-02-05 RX ORDER — OXYCODONE AND ACETAMINOPHEN 5; 325 MG/1; MG/1
1 TABLET ORAL EVERY 6 HOURS PRN
Qty: 5 TABLET | Refills: 0 | Status: SHIPPED | OUTPATIENT
Start: 2025-02-05

## 2025-02-05 RX ORDER — POLYETHYLENE GLYCOL 3350 17 G/17G
17 POWDER, FOR SOLUTION ORAL DAILY
Status: DISCONTINUED | OUTPATIENT
Start: 2025-02-05 | End: 2025-02-05 | Stop reason: HOSPADM

## 2025-02-05 RX ORDER — AMOXICILLIN AND CLAVULANATE POTASSIUM 875; 125 MG/1; MG/1
875 TABLET, FILM COATED ORAL 2 TIMES DAILY
Qty: 14 TABLET | Refills: 0 | Status: SHIPPED | OUTPATIENT
Start: 2025-02-05 | End: 2025-02-12

## 2025-02-05 RX ADMIN — OXYCODONE HYDROCHLORIDE 5 MG: 5 TABLET ORAL at 08:05

## 2025-02-05 RX ADMIN — POLYETHYLENE GLYCOL 3350 17 G: 17 POWDER, FOR SOLUTION ORAL at 08:39

## 2025-02-05 RX ADMIN — KETOROLAC TROMETHAMINE 30 MG: 30 INJECTION, SOLUTION INTRAMUSCULAR at 02:11

## 2025-02-05 RX ADMIN — PIPERACILLIN SODIUM AND TAZOBACTAM SODIUM 3.38 G: 3; .375 INJECTION, SOLUTION INTRAVENOUS at 00:04

## 2025-02-05 RX ADMIN — PIPERACILLIN SODIUM AND TAZOBACTAM SODIUM 3.38 G: 3; .375 INJECTION, SOLUTION INTRAVENOUS at 08:06

## 2025-02-05 RX ADMIN — SERTRALINE HYDROCHLORIDE 100 MG: 100 TABLET ORAL at 08:05

## 2025-02-05 RX ADMIN — ACETAMINOPHEN 650 MG: 325 TABLET ORAL at 02:10

## 2025-02-05 RX ADMIN — ACETAMINOPHEN 650 MG: 325 TABLET ORAL at 08:05

## 2025-02-05 RX ADMIN — KETOROLAC TROMETHAMINE 30 MG: 30 INJECTION, SOLUTION INTRAMUSCULAR at 09:36

## 2025-02-05 ASSESSMENT — PAIN SCALES - GENERAL
PAINLEVEL_OUTOF10: 0 - NO PAIN
PAINLEVEL_OUTOF10: 4
PAINLEVEL_OUTOF10: 2
PAINLEVEL_OUTOF10: 3

## 2025-02-05 ASSESSMENT — PAIN - FUNCTIONAL ASSESSMENT
PAIN_FUNCTIONAL_ASSESSMENT: 0-10

## 2025-02-05 ASSESSMENT — PAIN DESCRIPTION - LOCATION: LOCATION: ABDOMEN

## 2025-02-05 NOTE — PROGRESS NOTES
Christina JULIAN Jennyfer 59 y.o. female    Subjective  Patient seen and examined this morning.  Denies nausea and vomiting.  No fever or chills. Tolerating diet.  Pain controlled.  Urinating well, no dysuria. Up ambulating. No acute events overnight.    Objective  PHYSICAL EXAM:  Physical Exam  Vitals reviewed.   Constitutional:       General: She is awake.      Appearance: Normal appearance.   Cardiovascular:      Rate and Rhythm: Normal rate and regular rhythm.      Pulses: Normal pulses.      Heart sounds: Normal heart sounds.   Pulmonary:      Effort: Pulmonary effort is normal.      Breath sounds: Normal breath sounds and air entry.   Abdominal:      General: Abdomen is flat. There is no distension.      Palpations: Abdomen is soft.      Tenderness: There is abdominal tenderness. There is no guarding or rebound.      Comments: Soft, non-distended.  Incisions well approximated with glue, C/D/I.  Appropriately TTP.    Musculoskeletal:         General: Normal range of motion.      Cervical back: Normal range of motion.   Skin:     General: Skin is warm and dry.   Neurological:      General: No focal deficit present.      Mental Status: She is alert and oriented to person, place, and time.   Psychiatric:         Behavior: Behavior is cooperative.         Vital signs in last 24 hours:  Vitals:    02/05/25 0758   BP: 156/87   Pulse: 85   Resp: 18   Temp: 36.5 °C (97.7 °F)   SpO2: 96%         Intake/Output this shift:    Intake/Output Summary (Last 24 hours) at 2/5/2025 1024  Last data filed at 2/5/2025 0404  Gross per 24 hour   Intake 2173.33 ml   Output 325 ml   Net 1848.33 ml        Allergies:  No Known Allergies     Medications:  Scheduled medications  acetaminophen, 650 mg, oral, q6h  enoxaparin, 40 mg, subcutaneous, q24h  ketorolac, 30 mg, intravenous, q6h BHARGAV  piperacillin-tazobactam, 3.375 g, intravenous, q8h  polyethylene glycol, 17 g, oral, Daily  psyllium, 1 packet, oral, Daily  rosuvastatin, 5 mg, oral,  Nightly  sertraline, 100 mg, oral, Daily      Continuous medications     PRN medications  PRN medications: albuterol, benzocaine-menthol, naloxone, ondansetron ODT **OR** ondansetron, oxyCODONE, oxyCODONE       Labs:  Results for orders placed or performed during the hospital encounter of 02/04/25 (from the past 24 hours)   CBC   Result Value Ref Range    WBC 15.1 (H) 4.4 - 11.3 x10*3/uL    nRBC 0.0 0.0 - 0.0 /100 WBCs    RBC 3.75 (L) 4.00 - 5.20 x10*6/uL    Hemoglobin 11.1 (L) 12.0 - 16.0 g/dL    Hematocrit 34.6 (L) 36.0 - 46.0 %    MCV 92 80 - 100 fL    MCH 29.6 26.0 - 34.0 pg    MCHC 32.1 32.0 - 36.0 g/dL    RDW 12.3 11.5 - 14.5 %    Platelets 306 150 - 450 x10*3/uL   Basic metabolic panel   Result Value Ref Range    Glucose 136 (H) 74 - 99 mg/dL    Sodium 140 136 - 145 mmol/L    Potassium 4.4 3.5 - 5.3 mmol/L    Chloride 105 98 - 107 mmol/L    Bicarbonate 28 21 - 32 mmol/L    Anion Gap 11 10 - 20 mmol/L    Urea Nitrogen 16 6 - 23 mg/dL    Creatinine 0.59 0.50 - 1.05 mg/dL    eGFR >90 >60 mL/min/1.73m*2    Calcium 8.8 8.6 - 10.3 mg/dL        Imaging:  CT abdomen pelvis w IV contrast    Result Date: 2/4/2025  Interpreted By:  Feliciano Alicea, STUDY: CT ABDOMEN PELVIS W IV CONTRAST;  2/4/2025 10:50 am   INDICATION: 60 y/o   F with  Signs/Symptoms:Status post cholecystectomy, right lower and upper quadrant pain..     LIMITATIONS: None.   ACCESSION NUMBER(S): [id number]   ORDERING CLINICIAN: VALERIO ALDRICH   TECHNIQUE: After the administration of   oral water and IV nonionic contrast, spiral axial images were obtained from the xiphoid down through the symphysis pubis. Sagittal and coronal reconstruction images were generated. Bone, mediastinal, lung, and liver windows were reviewed. Omnipaque 350   75 ML   COMPARISON: Prior exam from 12/23/2015..   FINDINGS: LUNG BASES: Cardiomegaly. No pericardial effusion. No pleural effusion. There is mild patchy hazy ground-glass density at the lung bases. There are several tiny  noncalcified bibasilar lung nodules for example on image 60 of the lung windows. These are more numerous on the left than on the right. These are grossly stable.   LIVER: Hepatomegaly, with the liver measuring 19.1 cm on the right Liver density was  within the limits of normal. No  liver lesion evident in this  exam.   GALLBLADDER: Surgically absent.   BILE DUCTS: No intrahepatic biliary ductal dilatation.  Common bile duct was within the limits of normal.   SPLEEN: No splenomegaly. No splenic mass..   PANCREAS: No pancreatic mass or inflammation, or ductal dilatation.   KIDNEYS/ADRENALS: No adrenal mass or enlargement. There is extrarenal pelvis on the right. There is dilatation of the left renal collecting system ending abruptly at the left ureteropelvic junction, unchanged. There is no adjacent edema on the left. There is no calcified stone on either side. No renal mass on either side. No right-sided perinephric edema. No ureteral stone or dilatation.   BLADDER/PELVIS: Urinary bladder was grossly intact. No uterine enlargement or gross adnexal mass.   GREAT VESSELS/RETROPERITONEUM: Abdominal aorta and IVC were intact. No suspicious retroperitoneal adenopathy. No suspicious mesenteric adenopathy. No suspicious pelvic or inguinal adenopathy.   PERITONEUM: No ascites. No pneumoperitoneum. No peritoneal or mesenteric mass or inflammation.   BOWEL: Tiny hiatal hernia. Otherwise, the stomach was  grossly intact. There was no small bowel dilatation or small bowel wall thickening. No small-bowel obstruction. There was no colonic wall thickening or large bowel obstruction.  No edema adjacent to the colon. The cecal appendix is enlarged at 12 mm diameter with wall thickening and adjacent fat stranding. There is no extraluminal gas or focal fluid collection.   BONES: No destructive lytic or blastic bone lesion. Vacuum disc phenomenon at L5-S1. Grade 1 anterolisthesis of L4 over L5 and of L5 over S1. Inter facet hypertrophy  with spurring from L3-4 through L5-S1.   ABDOMINAL WALL: Unremarkable.       CT findings consistent with acute appendicitis. There is currently no free air or abscess or obstruction.   Previous cholecystectomy.   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 Incidental extrarenal pelvis on the right.   Tiny hiatal hernia.   Patchy ground-glass infiltrative densities at both lung bases, suspicious for mild pneumonia.   Bibasilar subcentimeter noncalcified stable lung nodules, most likely sequela of previous infection or inflammation. These are more numerous on the left than on the right.   Arthritic changes in the lumbosacral spine as described.   Mild hepatomegaly.   MACRO: None   Signed by: Feliciano Alicea 2/4/2025 11:13 AM Dictation workstation:   [id number]           Plan  Acute appendicitis     POD# 1:  S/p laparoscopic appendectomy     - surgery as above  - avss  - Diet: regular   - Pain control  - Nausea: antiemetics PRN  - Encourage OOB and IS  - Lovenox for DVT prophylaxis  - ABX: zosyn.  Will transition to PO Augmentin BID x1 week   - Bowel regimen   - DC IVF    #Possible UTI  - Awaiting urine CX results  - Will transition to PO antibiotics on discharge     #HLD  - Home rosuvastatin resumed    #Depression  - Home sertraline resumed     - Daily labs    Plan of care discussed with Dr. Raines and patient may discharge home.  Home medications and prescriptions as discussed.  Follow up in 2 weeks.     REGIS Lainez-CNP    I spent 30 minutes in the professional and overall care of this patient.

## 2025-02-05 NOTE — DISCHARGE SUMMARY
Discharge Diagnosis  Acute appendicitis  Urinary tract infection  Obesity, BMI 37  Hyperlipidemia  Depression  Asthma    Issues Requiring Follow-Up  Post-operative follow-up    Test Results Pending At Discharge  Pending Labs       Order Current Status    Surgical Pathology Exam Collected (02/04/25 1640)    Urine Culture In process            Hospital Course  Patient presented to [facility] ED for evaluation of acute onset abdominal pain.  Evaluation in the ED was notable for leukocytosis, UTI, and acute appendicitis.  Patient was admitted to the surgical service, made NPO, initiated on intravenous antibiotics, and subsequently taken to the operating room for laparoscopic appendectomy.  Patient tolerated the surgery well.  Post-operatively the patient was initiated on regular diet, multi-modal pain management regimen, DVT chemoprophylaxis, and continued on antibiotics.  Patient was also resumed on home medications for hyperlipidemia, depression and asthma.  On post-operative day one, the patient was deemed appropriate for discharge to home and thus discharged to home with instructions for wound care, pain control, diet, activity, and follow-up.  Patient was discharged with additional antibiotics and pain medication.    Pertinent Physical Exam At Time of Discharge  Physical Exam  See progress note from day of discharge    Home Medications     Medication List      CONTINUE taking these medications     albuterol 90 mcg/actuation inhaler; Commonly known as: ProAir HFA;   Inhale 2 puffs every 4 hours if needed for wheezing or shortness of   breath.   cholecalciferol 50 mcg (2,000 unit) capsule; Commonly known as: Vitamin   D-3   loratadine 10 mg tablet; Commonly known as: Claritin   multivitamin tablet   omeprazole 20 mg DR capsule; Commonly known as: PriLOSEC   rosuvastatin 5 mg tablet; Commonly known as: Crestor; Take 1 tablet (5   mg) by mouth once daily.   sertraline 100 mg tablet; Commonly known as: Zoloft; Take 1 tablet  (100   mg) by mouth once daily.   traZODone 50 mg tablet; Commonly known as: Desyrel; Take 1 tablet (50   mg) by mouth as needed at bedtime for sleep.       Outpatient Follow-Up  No future appointments.    Braulio Raines MD

## 2025-02-05 NOTE — CARE PLAN
The clinical goals for the shift include Patient will tolerate diet this shift. No episodes of N/V. Patient will ambulate at least 3 times this shift.    Patient doing well. Tolerating diet. Bowel sounds present, passing gas. Voiding adequately. Pain tolerable with tylenol and Toradol only. VSS. No acute events overnight.       Problem: Pain - Adult  Goal: Verbalizes/displays adequate comfort level or baseline comfort level  Outcome: Progressing     Problem: Safety - Adult  Goal: Free from fall injury  Outcome: Progressing     Problem: Discharge Planning  Goal: Discharge to home or other facility with appropriate resources  Outcome: Progressing     Problem: Chronic Conditions and Co-morbidities  Goal: Patient's chronic conditions and co-morbidity symptoms are monitored and maintained or improved  Outcome: Progressing     Problem: Nutrition  Goal: Nutrient intake appropriate for maintaining nutritional needs  Outcome: Progressing     Problem: Pain  Goal: Takes deep breaths with improved pain control throughout the shift  Outcome: Progressing  Goal: Turns in bed with improved pain control throughout the shift  Outcome: Progressing  Goal: Walks with improved pain control throughout the shift  Outcome: Progressing  Goal: Performs ADL's with improved pain control throughout shift  Outcome: Progressing  Goal: Participates in PT with improved pain control throughout the shift  Outcome: Progressing  Goal: Free from opioid side effects throughout the shift  Outcome: Progressing  Goal: Free from acute confusion related to pain meds throughout the shift  Outcome: Progressing

## 2025-02-05 NOTE — NURSING NOTE
Discharge teaching completed, voiced understanding.  Taken via w/c to private vehicle.  Vitals stable, see flow sheet.

## 2025-02-05 NOTE — CARE PLAN
The patient's goals for the shift include      The clinical goals for the shift include Pt will have pain manageable level    Problem: Pain - Adult  Goal: Verbalizes/displays adequate comfort level or baseline comfort level  Outcome: Adequate for Discharge     Problem: Discharge Planning  Goal: Discharge to home or other facility with appropriate resources  Outcome: Adequate for Discharge     Pt was safely discharge to home.

## 2025-02-05 NOTE — DISCHARGE INSTRUCTIONS
WOUND CARE:  OK to shower 24 hours after surgery.  No tub bath/soaking/swimming until cleared at outpatient appointment.  Do not remove any staples or stitches; if these are present they will be removed at outpatient appointment.  Any glue will peel away on its own.    PAIN CONTROL:  Can utilize prescribed percocet as needed.  Do not combine percocet with acetaminophen (tylenol) to avoid exceeding safe daily recommended intake of acetaminophen.  Can utilize ibuprofen as needed.    ACTIVITY:  No heavy lifting (>10-15lbs) or strenuous activity for 4 weeks.  OK to walk and take stairs at slow pace.  Do not drive or operative heavy machinery for at least first 24 hours after surgery; if you do not feel able to safely operate after first 24 hours or are taking narcotics (ie percocet or vicodin) then do not operative heavy machinery or drive.    DIET:  Resume previous diet.    FOLLOW-UP:   Please call office at [phone] to schedule follow-up appointment for approximately two weeks from date of surgery.

## 2025-02-06 ENCOUNTER — PATIENT OUTREACH (OUTPATIENT)
Dept: CARE COORDINATION | Facility: CLINIC | Age: 60
End: 2025-02-06
Payer: COMMERCIAL

## 2025-02-06 NOTE — PROGRESS NOTES
[employer] member [place]ANN[place] outreach.    Spoke with member. I introduced myself and purpose of call.  Confirmed :   Rates pain 8-9/10. Holding off on taking pain medication since she thinks this is an ileus.  Member has Rx given at discharge. Taking Amoxicillin-pot clavulenate and miralax  Endorses adequate fluid intake.  Initially passing gas, but that has stopped.  Confirms having AVS and has contact info for surgeon.  No questions or concerns at this time.  No follow up scheduled at time of outreach.  Available if needed.  Christina SALAZAR, [facility] Population Health  Office Phone: [phone]     Engagement  Call Start Time: 1257 (2025  1:10 PM)    Medications  Medications reviewed with patient/caregiver?: Yes (2025  1:10 PM)  Is the patient having any side effects they believe may be caused by any medication additions or changes?: No (2025  1:10 PM)  Does the patient have all medications ordered at discharge?: Yes (2025  1:10 PM)  Is the patient taking all medications as directed (includes completed medication regime)?: No (2025  1:10 PM)  What is preventing the patient from taking all medications as directed?: -- (Believes she is experiencing ileus [id number] surgery and is avoiding the pain medication. Endorses taking the antibiotics) (2025  1:10 PM)    Appointments  Does the patient have a primary care provider?: Yes (2025  1:10 PM)  Care Management Interventions: -- (No appointment scheduled at time of call.) (2025  1:10 PM)    Patient Teaching  Does the patient have access to their discharge instructions?: Yes (2025  1:10 PM)

## 2025-02-11 LAB
LABORATORY COMMENT REPORT: NORMAL
PATH REPORT.FINAL DX SPEC: NORMAL
PATH REPORT.GROSS SPEC: NORMAL
PATH REPORT.RELEVANT HX SPEC: NORMAL
PATH REPORT.TOTAL CANCER: NORMAL

## 2025-02-20 ENCOUNTER — OFFICE VISIT (OUTPATIENT)
Dept: SURGERY | Facility: CLINIC | Age: 60
End: 2025-02-20
Payer: COMMERCIAL

## 2025-02-20 VITALS
SYSTOLIC BLOOD PRESSURE: 112 MMHG | BODY MASS INDEX: 36.32 KG/M2 | WEIGHT: 205 LBS | HEIGHT: 63 IN | DIASTOLIC BLOOD PRESSURE: 71 MMHG | TEMPERATURE: 97.6 F | HEART RATE: 76 BPM

## 2025-02-20 DIAGNOSIS — K35.80 ACUTE APPENDICITIS, UNSPECIFIED ACUTE APPENDICITIS TYPE: Primary | ICD-10-CM

## 2025-02-20 PROCEDURE — 99211 OFF/OP EST MAY X REQ PHY/QHP: CPT | Performed by: NURSE PRACTITIONER

## 2025-02-20 PROCEDURE — 3008F BODY MASS INDEX DOCD: CPT | Performed by: NURSE PRACTITIONER

## 2025-02-20 ASSESSMENT — ENCOUNTER SYMPTOMS
RESPIRATORY NEGATIVE: 1
CARDIOVASCULAR NEGATIVE: 1
FREQUENCY: 0
MUSCULOSKELETAL NEGATIVE: 1
GASTROINTESTINAL NEGATIVE: 1

## 2025-02-20 NOTE — PROGRESS NOTES
History Of Present Illness  Christina Burger is a 59 y.o. female who is s/p lx appendectomy by DR. Raines on 2/4/25 .    Discharged on 2/5/25 with no complications.    Pathology:  A. APPENDIX:    -- Acute appendicitis.  -- Fecaliths.     She has been feeling well since the surgery and only has minimal soreness to her abdomen now.  She is having some issues with her BM's and will have a loose BM every other day.  No c/o any rectal bleeding.  She still has a low appetite but no n/v.      Past Medical History  She has a past medical history of Acute bilateral low back pain with left-sided sciatica (07/07/2023), Anxiety, Depression, Follicular cyst of the skin and subcutaneous tissue, unspecified (06/10/2018), Other conditions influencing health status, Other specified disorders of eustachian tube, right ear (03/28/2022), Personal history of other diseases of the digestive system (11/26/2018), and Seasonal allergies.    Surgical History  She has a past surgical history that includes Other surgical history (12/14/2018); Other surgical history (09/10/2019); and Breast biopsy.     Social History  She reports that she has never smoked. She has never used smokeless tobacco. She reports current alcohol use. She reports that she does not use drugs.    Family History  No family history on file.     Allergies  Patient has no known allergies.    Review of Systems   Respiratory: Negative.     Cardiovascular: Negative.    Gastrointestinal: Negative.    Genitourinary:  Negative for frequency.   Musculoskeletal: Negative.         Physical Exam  Vitals reviewed. Exam conducted with a chaperone present.   HENT:      Head: Normocephalic.   Cardiovascular:      Rate and Rhythm: Normal rate and regular rhythm.   Pulmonary:      Effort: Pulmonary effort is normal.      Breath sounds: Normal breath sounds.   Abdominal:      General: Abdomen is flat. Bowel sounds are normal.      Palpations: Abdomen is soft.      Comments: Her incisions have  healed nicely   Genitourinary:     Rectum: Normal.   Musculoskeletal:         General: Normal range of motion.   Skin:     General: Skin is warm and dry.   Neurological:      General: No focal deficit present.   Psychiatric:         Mood and Affect: Mood normal.         Behavior: Behavior normal.         Thought Content: Thought content normal.         Judgment: Judgment normal.          Last Recorded Vitals  /71   Pulse 76   Temp 36.4 °C (97.6 °F)   Wt 93 kg (205 lb)          Assessment/Plan   Christina has done well over the past weeks since her appendix was removed.  She will resume a regular diet now and slowly increase her fiber intake.  She will continue to not lift over 10 pounds for a total of 6 weeks.  She will call with any issues and will follow up on an as needed basis.         Ave Young, APRN-CNP

## 2025-03-11 ENCOUNTER — TELEPHONE (OUTPATIENT)
Dept: SURGERY | Facility: CLINIC | Age: 60
End: 2025-03-11
Payer: COMMERCIAL

## 2025-03-11 NOTE — TELEPHONE ENCOUNTER
I called and left a voicemail to update Christina on short term disability paperwork. Short term disability paperwork will be sent off today. I invited her to call me back with any questions or concerns.

## 2025-03-14 ENCOUNTER — PATIENT OUTREACH (OUTPATIENT)
Dept: CARE COORDINATION | Facility: CLINIC | Age: 60
End: 2025-03-14
Payer: COMMERCIAL

## 2025-03-14 NOTE — PROGRESS NOTES
EHP [place] 30 day outreach wrap up. Left message requesting return call. Contact info provided.   Christina SALAZAR, [facility] Population [facility]  Office Phone: [phone]

## 2025-03-18 ENCOUNTER — PATIENT OUTREACH (OUTPATIENT)
Dept: CARE COORDINATION | Facility: CLINIC | Age: 60
End: 2025-03-18
Payer: COMMERCIAL

## 2025-03-18 NOTE — PROGRESS NOTES
EHP FRANNIE 30 day post discharge follow up. Member return call. No readmission  following initial admission 2/4/25. Doing well. No assistance needed.  Outreach wrap up.  Christina SALAZAR, [facility] Population Health  Office Phone: [phone]

## 2025-03-18 NOTE — PROGRESS NOTES
EHP [place]ANNIE [place] 30 day wrap up. Left message requesting return call. Contact info included.   Christina SALAZAR, [facility] Population [facility]  Office Phone: [phone]

## 2025-06-04 ENCOUNTER — CLINICAL SUPPORT (OUTPATIENT)
Dept: URGENT CARE | Age: 60
End: 2025-06-04
Payer: COMMERCIAL

## 2025-06-04 VITALS
HEART RATE: 99 BPM | RESPIRATION RATE: 18 BRPM | WEIGHT: 205 LBS | TEMPERATURE: 98 F | SYSTOLIC BLOOD PRESSURE: 140 MMHG | DIASTOLIC BLOOD PRESSURE: 80 MMHG | OXYGEN SATURATION: 100 % | BODY MASS INDEX: 36.31 KG/M2

## 2025-06-04 DIAGNOSIS — Z20.7 SCABIES EXPOSURE: Primary | ICD-10-CM

## 2025-06-04 DIAGNOSIS — L28.2 PRURITIC RASH: ICD-10-CM

## 2025-06-04 RX ORDER — METHYLPREDNISOLONE 4 MG/1
TABLET ORAL
Qty: 21 TABLET | Refills: 0 | Status: SHIPPED | OUTPATIENT
Start: 2025-06-04

## 2025-06-04 RX ORDER — TRIAMCINOLONE ACETONIDE 1 MG/G
OINTMENT TOPICAL 2 TIMES DAILY
Qty: 30 G | Refills: 0 | Status: SHIPPED | OUTPATIENT
Start: 2025-06-04

## 2025-06-04 RX ORDER — PERMETHRIN 0.25 %
SPRAY, NON-AEROSOL (ML) MISCELLANEOUS ONCE
Qty: 118 ML | Refills: 0 | Status: SHIPPED | OUTPATIENT
Start: 2025-06-04 | End: 2025-06-04 | Stop reason: ALTCHOICE

## 2025-06-04 RX ORDER — IVERMECTIN 3 MG/1
TABLET ORAL
Qty: 4 TABLET | Refills: 0 | Status: SHIPPED | OUTPATIENT
Start: 2025-06-04

## 2025-06-04 RX ORDER — IVERMECTIN 10 MG/G
CREAM TOPICAL
Qty: 45 G | Refills: 0 | Status: SHIPPED | OUTPATIENT
Start: 2025-06-04

## 2025-06-04 RX ORDER — PERMETHRIN 0.25 %
SPRAY, NON-AEROSOL (ML) MISCELLANEOUS ONCE
Qty: 118 ML | Refills: 0 | Status: SHIPPED | OUTPATIENT
Start: 2025-06-04 | End: 2025-06-04

## 2025-06-04 ASSESSMENT — ENCOUNTER SYMPTOMS
DIARRHEA: 0
CARDIOVASCULAR NEGATIVE: 1
FEVER: 0
NEUROLOGICAL NEGATIVE: 1
SHORTNESS OF BREATH: 0
PSYCHIATRIC NEGATIVE: 1
SORE THROAT: 0

## 2025-06-04 NOTE — PROGRESS NOTES
Subjective   Patient ID: Christina Burger is a 60 y.o. female. They present today with a chief complaint of Rash (Pt fears it is scabies ).    History of Present Illness    Rash  This is a new problem. The current episode started in the past 7 days. The problem has been gradually worsening since onset. The rash is diffuse. The rash is characterized by itchiness and redness. Associated with: possible scabies exposure from staying at a hotel. Pertinent negatives include no diarrhea, facial edema, fever, joint pain, shortness of breath or sore throat.       Past Medical History  Allergies as of 06/04/2025    (No Known Allergies)       Prescriptions Prior to Admission[1]     Medical History[2]    Surgical History[3]     reports that she has never smoked. She has never used smokeless tobacco. She reports current alcohol use. She reports that she does not use drugs.    Review of Systems  Review of Systems   Constitutional:  Negative for fever.   HENT:  Negative for sore throat.    Respiratory:  Negative for shortness of breath.    Cardiovascular: Negative.    Gastrointestinal:  Negative for diarrhea.   Musculoskeletal:  Negative for joint pain.   Skin:  Positive for rash.   Neurological: Negative.    Psychiatric/Behavioral: Negative.                                    Objective    Vitals:    06/04/25 0937   BP: 140/80   Pulse: 99   Resp: 18   Temp: 36.7 °C (98 °F)   SpO2: 100%   Weight: 93 kg (205 lb)     No LMP recorded. Patient is postmenopausal.    Physical Exam  Constitutional:       Appearance: Normal appearance.   HENT:      Head: Normocephalic and atraumatic.   Eyes:      Pupils: Pupils are equal, round, and reactive to light.   Musculoskeletal:      Cervical back: Normal range of motion.   Skin:     Findings: Rash present.   Neurological:      General: No focal deficit present.      Mental Status: She is alert and oriented to person, place, and time.   Psychiatric:         Mood and Affect: Mood normal.          Behavior: Behavior normal.         Thought Content: Thought content normal.         Procedures    Point of Care Test & Imaging Results from this visit  No results found for this visit on 06/04/25.   Imaging  No results found.    Cardiology, Vascular, and Other Imaging  No other imaging results found for the past 2 days      Diagnostic study results (if any) were reviewed by [facility].    Assessment/Plan   Allergies, medications, history, and pertinent labs/EKGs/Imaging reviewed by REGIS Bautista-CNP.     Medical Decision Making  Urgent Care Course:  59 yo presents to the  with pruritic rash.  Patient is afebrile, hemodynamically stable.  Patient denies fever, n/v, cough, cp, sob, ab pain, dysuria, and diarrhea.  Patient believes that the rash is caused by possible scabies exposure.  Patient likely with dermatitis secondary to scabies.   Patient given prescription for medrol pavel and triamcinolone cream for pruritus -to use after permethrin wash.   Advised to use permethrin wash x 1 dose and ivermectin cream as directed for scabies exposure.   If no relief may take oral dose of ivermectin x 1.   Patient given rash warning signs and will f/u with pcp.        Independent Hx provided by: Patient     Pt’s case/impression summarized and discussed with: Patient     Likely Dx given clinical picture: Rash, Contact dermatitis, scabies     Although not an exhaustive list of Differential Diagnosis (though considered), patient’s HPI, PE, and other findings are not suggestive of: Cellulitis, contact dermatitis, hives, allergic reaction, necrotizing fasciitis, allergic dermatitis      Patient at time of discharge was clinically well-appearing and HDS for outpatient management. The patient and/or family was given the opportunity to ask questions prior to discharge, understood my verbal discussion of the plans for treatment, expected course, indications to return to ED, and the need for timely follow up as  directed.       Condition: Stable     Disposition: Discharge         Orders and Diagnoses  Diagnoses and all orders for this visit:  Scabies exposure  -     ivermectin 1 % cream; Apply to the affected area(s) once daily.  -     permethrin (Nix Creme Rinse) 1 % liquid; Apply topically 1 time for 1 dose.  -     ivermectin (Stromectol) 3 mg tablet; Take 2 tablest by mouth for 1 dose. May repeat dose in 7-14 days if needed.  Pruritic rash  -     triamcinolone (Kenalog) 0.1 % ointment; Apply topically 2 times a day.  -     methylPREDNISolone (Medrol Dospak) 4 mg tablets; Take as directed on package.      Medical Admin Record      Patient disposition: Home    Electronically signed by [facility]  12:04 PM           [1] (Not in a hospital admission)   [2]   Past Medical History:  Diagnosis Date    Acute bilateral low back pain with left-sided sciatica 2023    Anxiety     Depression     Follicular cyst of the skin and subcutaneous tissue, unspecified 06/10/2018    Scalp cyst    Other conditions influencing health status      3    Other specified disorders of eustachian tube, right ear 2022    Dysfunction of right eustachian tube    Personal history of other diseases of the digestive system 2018    History of biliary colic    Seasonal allergies    [3]   Past Surgical History:  Procedure Laterality Date    BREAST BIOPSY      OTHER SURGICAL HISTORY  2018    Cholecystectomy laparoscopic    OTHER SURGICAL HISTORY  09/10/2019    Breast surgery

## 2025-07-08 ENCOUNTER — APPOINTMENT (OUTPATIENT)
Dept: PRIMARY CARE | Facility: CLINIC | Age: 60
End: 2025-07-08
Payer: COMMERCIAL

## 2025-07-08 VITALS
HEIGHT: 63 IN | BODY MASS INDEX: 37.03 KG/M2 | OXYGEN SATURATION: 97 % | SYSTOLIC BLOOD PRESSURE: 124 MMHG | WEIGHT: 209 LBS | HEART RATE: 79 BPM | DIASTOLIC BLOOD PRESSURE: 80 MMHG

## 2025-07-08 DIAGNOSIS — E66.812 CLASS 2 SEVERE OBESITY DUE TO EXCESS CALORIES WITH SERIOUS COMORBIDITY AND BODY MASS INDEX (BMI) OF 37.0 TO 37.9 IN ADULT: ICD-10-CM

## 2025-07-08 DIAGNOSIS — E66.01 CLASS 2 SEVERE OBESITY DUE TO EXCESS CALORIES WITH SERIOUS COMORBIDITY AND BODY MASS INDEX (BMI) OF 37.0 TO 37.9 IN ADULT: ICD-10-CM

## 2025-07-08 DIAGNOSIS — Z00.00 ANNUAL PHYSICAL EXAM: Primary | ICD-10-CM

## 2025-07-08 DIAGNOSIS — Z13.29 THYROID DISORDER SCREENING: ICD-10-CM

## 2025-07-08 DIAGNOSIS — R53.83 OTHER FATIGUE: ICD-10-CM

## 2025-07-08 DIAGNOSIS — E78.2 MIXED HYPERLIPIDEMIA: ICD-10-CM

## 2025-07-08 DIAGNOSIS — Z13.220 LIPID SCREENING: ICD-10-CM

## 2025-07-08 PROCEDURE — 3008F BODY MASS INDEX DOCD: CPT | Performed by: STUDENT IN AN ORGANIZED HEALTH CARE EDUCATION/TRAINING PROGRAM

## 2025-07-08 PROCEDURE — 1036F TOBACCO NON-USER: CPT | Performed by: STUDENT IN AN ORGANIZED HEALTH CARE EDUCATION/TRAINING PROGRAM

## 2025-07-08 PROCEDURE — 99396 PREV VISIT EST AGE 40-64: CPT | Performed by: STUDENT IN AN ORGANIZED HEALTH CARE EDUCATION/TRAINING PROGRAM

## 2025-07-08 RX ORDER — ROSUVASTATIN CALCIUM 5 MG/1
5 TABLET, COATED ORAL DAILY
Qty: 100 TABLET | Refills: 3 | Status: SHIPPED | OUTPATIENT
Start: 2025-07-08 | End: 2026-08-12

## 2025-07-08 NOTE — PROGRESS NOTES
"Subjective   Patient ID: Christina Burger is a 60 y.o. female who presents for Annual Exam (Physical /Had a swollen lymph node in groin that was painful - it has come down a lot now and not painful anymore but did want to mention it today. /Due for mammogram. Had annual labs done in January).  History of Present Illness  The patient presents for a routine checkup.    She discovered a lump in her groin which she assumed to be a lymph node. After 2 weeks, it became cellulitic, enlarged, red, and warm. She sought medical attention and was prescribed antibiotics. The symptoms subsided within 24 to 48 hours, leaving only a slight residual presence.    She experiences occasional difficulty sleeping. She takes trazodone sparingly as it leaves her feeling groggy and unrefreshed.    She occasionally experiences sinus headaches when she feels congested. These headaches have intensified recently with weather fluctuations.     She reports no abdominal pain or changes in digestion. She is considering gastric surgery and would like a referral to bariatrics.     She experiences rare ankle swelling, which resolves overnight. Hot and humid weather seems to exacerbate this condition.    She has received the influenza vaccine and is due for a Tdap vaccine, although she had a severe reaction to it in the past, resulting in arm swelling.    She underwent a colonoscopy in 12/2024, during which a few polyps were found. The next colonoscopy is recommended in 2027.    Review of Systems  ROS otherwise negative aside from what was mentioned above in HPI.    Objective     /80 (BP Location: Right arm, Patient Position: Sitting, BP Cuff Size: Adult long)   Pulse 79   Ht 1.6 m (5' 3\")   Wt 94.8 kg (209 lb)   SpO2 97%   BMI 37.02 kg/m²      Current Outpatient Medications   Medication Instructions    albuterol (ProAir HFA) 90 mcg/actuation inhaler 2 puffs, inhalation, Every 4 hours PRN    cholecalciferol (Vitamin D-3) 50 mcg (2,000 " unit) capsule 1 capsule, Daily    ivermectin (Stromectol) 3 mg tablet Take 2 tablest by mouth for 1 dose. May repeat dose in 7-14 days if needed.    ivermectin 1 % cream Apply to the affected area(s) once daily.    loratadine (CLARITIN) 10 mg, Daily    methylPREDNISolone (Medrol Dospak) 4 mg tablets Take as directed on package.    multivitamin tablet 1 tablet, Daily    omeprazole (PRILOSEC) 20 mg, Daily PRN    oxyCODONE-acetaminophen (Percocet) 5-325 mg tablet 1 tablet, oral, Every 6 hours PRN    rosuvastatin (CRESTOR) 5 mg, oral, Daily    sertraline (ZOLOFT) 100 mg, oral, Daily    traZODone (DESYREL) 50 mg, oral, Nightly PRN    triamcinolone (Kenalog) 0.1 % ointment Topical, 2 times daily     Physical Exam  Vitals reviewed.   Constitutional:       General: She is not in acute distress.  HENT:      Head: Normocephalic.      Right Ear: External ear normal.      Left Ear: External ear normal.      Nose: Nose normal.   Eyes:      Extraocular Movements: Extraocular movements intact.      Pupils: Pupils are equal, round, and reactive to light.   Cardiovascular:      Rate and Rhythm: Normal rate and regular rhythm.      Heart sounds: Normal heart sounds.   Pulmonary:      Effort: Pulmonary effort is normal.      Breath sounds: Normal breath sounds.   Abdominal:      Palpations: Abdomen is soft.      Tenderness: There is no abdominal tenderness. There is no guarding.   Musculoskeletal:         General: Normal range of motion.      Cervical back: Normal range of motion and neck supple.   Skin:     General: Skin is warm and dry.   Neurological:      Mental Status: She is alert. Mental status is at baseline.   Psychiatric:         Mood and Affect: Mood normal.         Behavior: Behavior normal.           Results      Assessment & Plan  1. Lymphadenopathy.  - Reported a lump that became cellulitic, red, and warm after 2 weeks.  - Antibiotics were taken, and the symptoms resolved within 24-48 hours.  - No further treatment is  necessary at this time.    2. Insomnia.  - Experiences trouble sleeping and occasionally uses trazodone but avoids it due to grogginess.  - No changes in medication were made.    3. Sinus headaches.  - Experiences sinus headaches occasionally, especially when stuffy.  - No new treatment was discussed.    4. Health Maintenance.  - Last mammogram conducted in 05/2024.  - Colonoscopy performed in 12/2024, with a recommendation for a repeat procedure in 3 years.  - Blood sugar levels have improved from the prediabetic range. Thyroid and kidney functions are within normal limits.  - Mammogram order placed, which she can schedule at her convenience. Advised to receive the shingles vaccine at pharmacy. Fasting labs will be ordered in 01/2026 to assess kidney function, blood counts, and blood sugar levels.    5. Medication Management.  - Refill for rosuvastatin provided.    6. Weight management.  - Considering gastric surgery and would like a referral      Follow up annually or sooner as needed    Daly Rice, DO     This medical note was created with the assistance of artificial intelligence (AI) for documentation purposes. The content has been reviewed and confirmed by the healthcare provider for accuracy and completeness. Patient consented to the use of audio recording and use of AI during their visit.

## 2025-07-11 ENCOUNTER — TELEPHONE (OUTPATIENT)
Dept: SURGERY | Facility: CLINIC | Age: 60
End: 2025-07-11
Payer: COMMERCIAL

## 2025-08-04 ENCOUNTER — NUTRITION (OUTPATIENT)
Dept: SURGERY | Facility: HOSPITAL | Age: 60
End: 2025-08-04
Payer: COMMERCIAL

## 2025-08-04 DIAGNOSIS — E66.812 CLASS 2 OBESITY DUE TO EXCESS CALORIES WITH BODY MASS INDEX (BMI) OF 37.0 TO 37.9 IN ADULT, UNSPECIFIED WHETHER SERIOUS COMORBIDITY PRESENT: ICD-10-CM

## 2025-08-04 DIAGNOSIS — E66.09 CLASS 2 OBESITY DUE TO EXCESS CALORIES WITH BODY MASS INDEX (BMI) OF 37.0 TO 37.9 IN ADULT, UNSPECIFIED WHETHER SERIOUS COMORBIDITY PRESENT: ICD-10-CM

## 2025-08-04 DIAGNOSIS — Z98.84 BARIATRIC SURGERY STATUS: Primary | ICD-10-CM

## 2025-08-04 NOTE — PROGRESS NOTES
"Subjective     Date: 8/4/2025 Time: 9:54 AM  Name: Christina Burger  MRN: [id number]    This is a 60 y.o. female with morbid obesity (There is no height or weight on file to calculate BMI.) who presents to clinic for consideration of bariatric surgery. she has attempted and failed multiple diet and exercise regimens for weight loss. Initial Onset of obesity was {Initial Obesity Onset:59318:::0}.  Their goal for surgery is to  {Weight Loss Interest:98252}. The patient has tried multiple diets to lose weight including {Previous Diet Trials:95257}. The patient was most successful with the {Most Successful Diet:18884}. The most pounds lost on this diet were *** lbs. The patient considers their dietary weakness to be {Dietary Weakness:01625} The patient reports a  {Weight Pattern:17714::\"highest weight ever of *** pounds\",\"lowest weight ever of *** pounds\"} {Distribution of Obesity:31626}. Current diet: {Diet:21952}. Compliance: {Compliance:42068} Diet Problems: {Diet Problems:80861} } Dietary Details Include:{Dietary Details:81804} The patient {Exercise Frequency:49990} {Excercise Duration (Optional):24694} {Types of Exercise (Optional):11894}    {Comorbidities:47928}  Problem List[1]    ESG    {GERDQOL:93077}    PMH: Medical History[2]     PSH: Surgical History[3]     STOPBANG *** (+ ***).   *** personal/family hx of VTE.    FAMILY HISTORY:  Family History[4]     SOCIAL HISTORY:  Social History[5]    MEDICATIONS:  Prior to Admission Medications:  Medication Documentation Review Audit       Reviewed by Daly Rice DO (Physician) on 07/08/25 at 1316      Medication Order Taking? Sig Documenting Provider Last Dose Status   albuterol (ProAir HFA) 90 mcg/actuation inhaler [id number] Yes Inhale 2 puffs every 4 hours if needed for wheezing or shortness of breath. Daly Rice DO Unknown Active   cholecalciferol (Vitamin D-3) 50 mcg (2,000 unit) capsule [id number] Yes Take 1 capsule (50 mcg) by mouth early in the " morning.. Historical Provider, MD Past Week Active   ivermectin (Stromectol) 3 mg tablet [id number]  Take 2 tablest by mouth for 1 dose. May repeat dose in 7-14 days if needed.   Patient not taking: Reported on 7/8/2025    PHIL Bautista  Active   ivermectin 1 % cream [id number]  Apply to the affected area(s) once daily.   Patient not taking: Reported on 7/8/2025    PHIL Bautista  Active   loratadine (Claritin) 10 mg tablet [id number] No Take 1 tablet (10 mg) by mouth once daily. Historical Provider, MD Past Week Active   methylPREDNISolone (Medrol Dospak) 4 mg tablets [id number]  Take as directed on package.   Patient not taking: Reported on 7/8/2025    PHIL Bautista  Active   multivitamin tablet [id number] Yes Take 1 tablet by mouth once daily. Historical Provider, MD Past Week Active   omeprazole (PriLOSEC) 20 mg DR capsule [id number] Yes Take 1 capsule (20 mg) by mouth once daily as needed (GI upset). Historical Provider, MD Past Week Active   oxyCODONE-acetaminophen (Percocet) 5-325 mg tablet [id number]  Take 1 tablet by mouth every 6 hours if needed for severe pain (7 - 10) for up to 5 doses.   Patient not taking: Reported on 2/20/2025    PHIL Lainez  Active   rosuvastatin (Crestor) 5 mg tablet [id number] Yes Take 1 tablet (5 mg) by mouth once daily. Daly Rice, DO 2/2/2025 Bedtime Active   sertraline (Zoloft) 100 mg tablet [id number] Yes Take 1 tablet (100 mg) by mouth once daily. Daly Rice, DO 2/3/2025 Morning Active   traZODone (Desyrel) 50 mg tablet [id number] Yes Take 1 tablet (50 mg) by mouth as needed at bedtime for sleep. Daly Rice, DO Unknown Active   triamcinolone (Kenalog) 0.1 % ointment [id number] Yes Apply topically 2 times a day. PHIL Bautista  Active                     ALLERGIES:  Allergies[6]    REVIEW OF SYSTEMS:  GENERAL: Negative for malaise, significant weight loss and fever  HEAD: Negative for headache,  swelling.  NECK: Negative for lumps, goiter, pain and significant neck swelling  RESPIRATORY: Negative for cough, wheezing or shortness of breath.  CARDIOVASCULAR: Negative for chest pain, leg swelling or palpitations.  GI: Negative for abdominal discomfort, blood in stools or black stools or change in bowel habits  : No history of dysuria, frequency or incontinence  MUSCULOSKELETAL: Negative for joint pain or swelling, back pain or muscle pain.  SKIN: Negative for lesions, rash, and itching.  PSYCH: Negative for sleep disturbance, mood disorder and recent psychosocial stressors.  ENDOCRINE: Negative for cold or heat intolerance, polyuria, polydipsia and goiter.    Objective   PHYSICAL EXAM:  Visit Vitals  OB Status Postmenopausal   Smoking Status Never     General appearance: obese, NAD  Neuro: AOx3  Head: EOMI; no swelling or lesions of scalp or face  ENT:  no lumps or lymphadenopathy, thyroid normal to palpation; oropharynx clear, no swelling or erythema  Skin: warm, no erythema or rashes  Lungs: clear to percussion and auscultation  Heart: regular rhythm and S1, S2 normal  Abdomen: soft, non-tender, no masses, no organomegaly  Extremities: Normal exam of the extremities. No swelling or pain.  Psych: no hurried speech, no flight of ideas, normal affect    Assessment/Plan   IMPRESSION:  Christina Burger is a 60 y.o. female with a BMI of There is no height or weight on file to calculate BMI. with the following diagnoses and co-morbidities:     Medical History[7]    This patient does meet the criteria for a surgical weight loss procedure according to NIH guidelines.  The risks of sleeve gastrectomy, Kristian-en-Y gastric bypass, and duodenal switch surgery including but not limited to bleeding, leak along staple lines, infection, dehydration, ulcers, internal hernia, DVT/PE, pneumonia, myocardial infarction, prolonged nausea/vomiting, incomplete resolution of associated medical conditions, reflux, weight regain,  vitamin/mineral deficiencies, and death have been explained to the patient and Christina Burger has expressed understanding and acceptance of them.     We discussed the lifestyle changes necessary to be successful following surgery.    The increased risk of substance and alcohol abuse following bariatric surgery was discussed with the patient, along with the negative consequences of substance/alcohol use after surgery including addiction, worsening of mental health disorders, and injury to the stomach. The risk of smoking and vaping (tobacco or any other substance) after bariatric surgery was explained to the patient. This includes risk of anastamotic ulcers, gastritis, bleeding, perforation, stricture, and PO intolerance.  The patient expressed understanding and acceptance of these risks.    ***The patient was advised not to become pregnant within 12-18 months following bariatric surgery. She was educated on the increased risks to mother and fetus associated with pregnancy within 2 years of bariatric surgery.    The benefits of the above surgeries including weight loss, improvement/resolution of associated medical and mental health conditions, improved mobility, and decreased mortality have been explained the the patient and Christina Burger has expressed understanding and acceptance of them.      PLAN:  The plan of treatment for Christina Burger is to continue with the consultations and tests ordered today in hopes of qualifying for pre-operative clearance for bariatric surgery. This includes:    Consult Nutrition for education and 3 months of MSWL  Consult Psychology  Labs ordered  PCP for medical optimization  Recommend at least *** lbs of weight loss prior to surgery.  ***        *** minutes were spent with patient including history, physical exam, and education.          [1]   Patient Active Problem List  Diagnosis    Acute bilateral low back pain with left-sided sciatica    Anxiety    Asymmetric SNHL  (sensorineural hearing loss)    Depression    Female stress incontinence    Insomnia    Irritable bowel syndrome    Seasonal allergic rhinitis    Vasomotor symptoms due to menopause    Vitamin D deficiency    Weight gain    Pain of foot    Muscle pain    Disorder of soft tissue    Opportunistic mycosis (Multi)    Otitis externa    Right ear pain    Segmental and somatic dysfunction    Ureteropelvic junction (UPJ) obstruction    Urinary tract infection    Acute appendicitis    Acute appendicitis, unspecified acute appendicitis type   [2]   Past Medical History:  Diagnosis Date    Acute bilateral low back pain with left-sided sciatica 2023    Anxiety     Depression     Follicular cyst of the skin and subcutaneous tissue, unspecified 06/10/2018    Scalp cyst    Other conditions influencing health status      3    Other specified disorders of eustachian tube, right ear 2022    Dysfunction of right eustachian tube    Personal history of other diseases of the digestive system 2018    History of biliary colic    Seasonal allergies    [3]   Past Surgical History:  Procedure Laterality Date    BREAST BIOPSY      OTHER SURGICAL HISTORY  2018    Cholecystectomy laparoscopic    OTHER SURGICAL HISTORY  09/10/2019    Breast surgery   [4] No family history on file.  [5]   Social History  Tobacco Use    Smoking status: Never    Smokeless tobacco: Never   Vaping Use    Vaping status: Never Used   Substance Use Topics    Alcohol use: Yes     Comment: socially    Drug use: Never   [6]   Allergies  Allergen Reactions    Boostrix Tdap [Diphth,Pertus(Acell),Tetanus] Swelling   [7]   Past Medical History:  Diagnosis Date    Acute bilateral low back pain with left-sided sciatica 2023    Anxiety     Depression     Follicular cyst of the skin and subcutaneous tissue, unspecified 06/10/2018    Scalp cyst    Other conditions influencing health status      3    Other specified disorders of eustachian  tube, right ear 03/28/2022    Dysfunction of right eustachian tube    Personal history of other diseases of the digestive system 11/26/2018    History of biliary colic    Seasonal allergies

## 2025-08-04 NOTE — PROGRESS NOTES
Surgeon: Dougie  Patient is considering:   ESG    ASSESSMENT:  Current weight:  209 lbs   Ht: 63  in   BMI: 37.02       Initial start weight: 209 lbs  Pre-Op Excess Body Weight (EBW):   68 lbs  Target Post-Op weight goal:  165- 175 lbs    Food allergies/intolerances:  NKFA  Chewing/Swallowing/Dentition:  none, in process of getting dental implant   Nausea / Vomiting / Hx Gastroparesis:  none  Diarrhea/ Constipation: occasional constipation, h/o IBS  Exercise level:  walking dog, 7,000 steps  Smoking/Tobacco use: none   Vitamins/Minerals supplements:  MVI, vitamin D, magnesium, K+   Medications:   see list  Past diet attempts:   exercise, low calorie   Hours of sleep/night: good sleep, sometimes wakes up in middle night but able to go back to sleep     24 HOUR RECALL/DIET HISTORY:  Breakfast:  large cup of coffee w/ creamer, 1 glass water;  Snack:  breakfast burrito , cut up fruit  Lunch: cheese and crackers   Snack:   Dinner: vegetable, protein, carb   Snack:   Beverages: large coffee, energy drinks at work, water, sparkling water w/ flavor, pop occasionally   Alcohol: every other weekends, 2-3 beer/mixed drinks/seltzers/wine     Person responsible for cooking & shopping? 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  Daily - cookie, ice cream, candies, julio bar  How often do you eat savory snacks?  Daily - crackers/cheese, nuts - often at work   How often do you eat out?  </=1x/week  Do you feel overly stuffed?  Yes, 1-2x/week   Binge Eating? Possible   Night Eating?  Denies   Emotional Eating?  Yes - more so at work, sometimes at home        READINESS TO LEARN:  Motivation to learn: Interested        Understanding of instruction: Good   Anticipated Compliance: Good         Family Support: Unable to assess-family not present           Educational Materials Provided:    Plate Method  High Protein Snack List  High Protein Drink  High Protein food list  Goals sheet        Patient presents with excessive calorie obesity seeking  ESG.    Patient seen today to complete nutrition evaluation for weight loss surgery. Patient reports she has tried to lose weight in the past, was most successful with 1200 calorie diet and exercise, but found that it was not sustainable and ended up stopping. Patient reports she has a high stress job and often finds she goes most of her day without eating, or snacking until the end of the day. More balanced meal is dinner. Patient tries to limit her high calorie beverages, drinks more water and sparkling water. Patient reports she walks mostly for exercise, using smart watch to track steps daily.     We discussed in order to prepare for bariatric surgery, certain dietary changes must occur.   Recommended to begin to eat 3 meals/day, avoid skipping meals. Encouraged to use a food journal to record food and fluid intake daily. Patient mentioned some triggered eating, and possible binge eating behaviors and was advised to note these episodes in food journal. Reviewed balanced meals by using plate method. Encouraged to eat protein rich foods at each meal, balanced with fiber rich foods. Reviewed fluids to eliminate and replace with SF, non-carbonated, caffeine free fluids. Recommended to drink 64 oz fluid/day.  Reviewed postop behaviors and encouraged to begin practicing. Recommended to continue tracking steps daily, increasing goal to 7-8K/day and consider exploring more options for structured exercise.     Patient was receptive to nutritional recommendations, asked numerous questions, and verbalized understanding of the weight loss surgery diet.  Patient expressed understanding about the importance of strict dietary compliance post-surgery to avoid nutritional deficiencies and achieve optimal weight loss and verbalized  "intent to follow dietary recommendations.      Malnutrition Screening:  Significant unintentional weight loss? No  Eating less than 75% of usual intake for more than 2 weeks? No      Nutrition Diagnosis:   1. Overweight/obesity related to excess energy intake as evidenced by BMI = 40 kg/m^2.  2. Food- and nutrition-related knowledge deficit related to lack of prior exposure to surgical weight loss information as evidenced by pt new to surgical program.    Nutrition Interventions:   1. Modify type and amount of food and nutrients within meals and snacks.  2. Comprehensive Nutrition Education    Recommendations:  1. Structure meal patterns, eating three meals and 1-2 snacks per day. Record what you eat and drink daily into a food journal. We will review this at your next session.  2. Start to wean down caffeine and avoid carbonated beverages. Drink 64oz of calorie-free, caffeine-free, and non-carbonated beverages.   3. Practice no drinking 30 minutes before meals, nothing with meals and wait 30 minutes after meals to drink again. Make meals last 30 minutes-chew thoroughly.   4. Create an \"Action List\" to write out activities that help you feel calm or relaxed when being triggered to eat out of stress or anxiety. Use this list first when being emotionally triggered to eat.   5. Increase your daily step count to 7,000-8,000/day. Explore what you might like to do for more structured exercise (walking, biking, exercise classes, water aerobics, joining a gym, etc)          MEAL PLANNING TIPS:  1. Build meals around protein rich foods. Aim for 3-4 ounces (20-30 grams) protein per meal. Lean proteins include chicken, turkey, fish, lean cuts of beef and 90% lean ground beef, pork, shrimp, low fat dairy products, and eggs.   2. Fill half your plate with non-starchy vegetables. Select high fiber starches like  sweet potatoes, peas, beans, lentils, quinoa, whole wheat breads and pastas, and brown rice. Keep portion of starches " to 1/4 plate (1/2 cup - 1 cup per meal).  3. Limit snacking between meals to only as needed. IF you need a snack, select foods that are high in protein (7-15 grams) and fiber (4 grams or more per serving).    Pre-op Goal weight: lose 5% of body weight    Nutrition Monitoring and Evaluation: 1-2 pound weight loss per week  Criteria: weight check  Need for Follow-up:     Patient does meet [employer] Health guidelines for weight loss surgery, however needs to demonstrate consistent effort in making dietary changes before giving clearance. It is anticipated that the patient will need at least 1-2    nutritional follow-up visits prior to clearance for surgery.

## 2025-08-05 ENCOUNTER — APPOINTMENT (OUTPATIENT)
Dept: SURGERY | Facility: HOSPITAL | Age: 60
End: 2025-08-05
Payer: COMMERCIAL

## 2025-08-05 DIAGNOSIS — E66.812 CLASS 2 OBESITY WITH BODY MASS INDEX (BMI) OF 37.0 TO 37.9 IN ADULT, UNSPECIFIED OBESITY TYPE, UNSPECIFIED WHETHER SERIOUS COMORBIDITY PRESENT: ICD-10-CM

## 2025-09-11 ENCOUNTER — APPOINTMENT (OUTPATIENT)
Dept: SURGERY | Facility: CLINIC | Age: 60
End: 2025-09-11
Payer: COMMERCIAL

## (undated) DEVICE — TRAY, SURESTEP, SILICONE DRAINAGE BAG, STATLOCK, 16FR

## (undated) DEVICE — RETRIEVAL SYSTEM, MONARCH, 10MM DISP ENDOSCOPIC

## (undated) DEVICE — GRASPER, LAPAROSCOPIC, DIRECT DRIVE, 5MM X35CM, DISP

## (undated) DEVICE — TROCAR, KII OPTICAL BLADELESS 5MM Z THREAD 100MM LNGTH

## (undated) DEVICE — STAPLER,  ENDO ECHELON 45MM RELOAD, BLUE

## (undated) DEVICE — SUTURE, MONOCRYL, 4-0, 27 IN, PS-2, UNDYED

## (undated) DEVICE — SOLUTION, IRRIGATION, SODIUM CHLORIDE 0.9%, 1000 ML, POUR BOTTLE

## (undated) DEVICE — SCISSORS, METZ, CURVED, 3/4 BLADE

## (undated) DEVICE — SUTURE, VICRYL, 0, 27 IN, UR-6, VIOLET

## (undated) DEVICE — Device

## (undated) DEVICE — SUTURE, VICRYL, 3-0, 27 IN, SH

## (undated) DEVICE — SOLUTION, IRRIGATION, STERILE WATER, 1000 ML, POUR BOTTLE

## (undated) DEVICE — ADHESIVE, SKIN, DERMABOND ADVANCED, 15CM, PEN-STYLE

## (undated) DEVICE — LIGASURE, V SEALER/DIVIDER  5MM BLUNT TIP

## (undated) DEVICE — STAPLER, ECHELON 3000, 45MM LONG

## (undated) DEVICE — SYRINGE, 10 CC, LUER LOCK

## (undated) DEVICE — GLOVE, SURGICAL, BIOGEL, 7.5, PF, LATEX, GREEN

## (undated) DEVICE — TROCAR, OPTICAL, BLADELESS, 12MM, THREADED, 100MM LENGTH

## (undated) DEVICE — TOWEL PACK, STERILE, 4/PACK, BLUE